# Patient Record
Sex: MALE | Race: WHITE | Employment: OTHER | URBAN - METROPOLITAN AREA
[De-identification: names, ages, dates, MRNs, and addresses within clinical notes are randomized per-mention and may not be internally consistent; named-entity substitution may affect disease eponyms.]

---

## 2017-01-20 ENCOUNTER — PATIENT OUTREACH (OUTPATIENT)
Dept: CASE MANAGEMENT | Age: 82
End: 2017-01-20

## 2017-01-20 DIAGNOSIS — M51.26 DISPLACEMENT OF LUMBAR INTERVERTEBRAL DISC WITHOUT MYELOPATHY: ICD-10-CM

## 2017-01-20 DIAGNOSIS — IMO0001 MILD AORTIC SCLEROSIS: ICD-10-CM

## 2017-01-20 DIAGNOSIS — M43.10 SPONDYLOLISTHESIS, GRADE 1: ICD-10-CM

## 2017-01-20 DIAGNOSIS — I87.2 VENOUS INSUFFICIENCY OF BOTH LOWER EXTREMITIES: ICD-10-CM

## 2017-01-20 DIAGNOSIS — N18.30 CKD (CHRONIC KIDNEY DISEASE) STAGE 3, GFR 30-59 ML/MIN (HCC): Primary | ICD-10-CM

## 2017-01-20 DIAGNOSIS — M48.061 SPINAL STENOSIS, LUMBAR REGION, WITHOUT NEUROGENIC CLAUDICATION: Chronic | ICD-10-CM

## 2017-01-20 DIAGNOSIS — E11.40 TYPE 2 DIABETES MELLITUS WITH DIABETIC NEUROPATHY, UNSPECIFIED LONG TERM INSULIN USE STATUS: ICD-10-CM

## 2017-01-20 DIAGNOSIS — E78.5 HYPERLIPIDEMIA WITH TARGET LDL LESS THAN 70: ICD-10-CM

## 2017-01-20 PROCEDURE — 99490 CHRNC CARE MGMT STAFF 1ST 20: CPT

## 2017-01-21 NOTE — PROGRESS NOTES
1/20/2017  Spoke to pt at length about CCM, current care plan and performed CCM assessment with pt reviewed meds and compliance.  Reviewed pt Ckd (chronic kidney disease) stage 3, gfr 30-59 ml/min  (primary encounter diagnosis)  Type 2 diabetes mellitus wit recovery time involved. Meds: Detailed medication review with pt. Discussed with pt if any potential barriers are present that could prevent compliance with medication regimen. At this time, no barriers reported.  Pt reports he is stable on all medications

## 2017-02-23 ENCOUNTER — APPOINTMENT (OUTPATIENT)
Dept: LAB | Age: 82
End: 2017-02-23
Attending: FAMILY MEDICINE
Payer: MEDICARE

## 2017-02-23 DIAGNOSIS — I87.2 VENOUS INSUFFICIENCY OF BOTH LOWER EXTREMITIES: ICD-10-CM

## 2017-02-23 DIAGNOSIS — IMO0001 MILD AORTIC SCLEROSIS: ICD-10-CM

## 2017-02-23 DIAGNOSIS — N18.30 CKD (CHRONIC KIDNEY DISEASE) STAGE 3, GFR 30-59 ML/MIN (HCC): ICD-10-CM

## 2017-02-23 LAB
BUN BLD-MCNC: 26 MG/DL (ref 8–20)
CALCIUM BLD-MCNC: 9.9 MG/DL (ref 8.3–10.3)
CHLORIDE: 101 MMOL/L (ref 101–111)
CO2: 33 MMOL/L (ref 22–32)
CREAT BLD-MCNC: 1.58 MG/DL (ref 0.7–1.3)
GLUCOSE BLD-MCNC: 111 MG/DL (ref 70–99)
POTASSIUM SERPL-SCNC: 3.7 MMOL/L (ref 3.6–5.1)
SODIUM SERPL-SCNC: 139 MMOL/L (ref 136–144)

## 2017-02-23 PROCEDURE — 36415 COLL VENOUS BLD VENIPUNCTURE: CPT

## 2017-02-23 PROCEDURE — 80048 BASIC METABOLIC PNL TOTAL CA: CPT

## 2017-03-02 ENCOUNTER — TELEPHONE (OUTPATIENT)
Dept: FAMILY MEDICINE CLINIC | Facility: CLINIC | Age: 82
End: 2017-03-02

## 2017-03-07 ENCOUNTER — APPOINTMENT (OUTPATIENT)
Dept: LAB | Age: 82
End: 2017-03-07
Attending: FAMILY MEDICINE
Payer: MEDICARE

## 2017-03-07 ENCOUNTER — TELEPHONE (OUTPATIENT)
Dept: FAMILY MEDICINE CLINIC | Facility: CLINIC | Age: 82
End: 2017-03-07

## 2017-03-07 DIAGNOSIS — R79.89 ELEVATED SERUM CREATININE: Primary | ICD-10-CM

## 2017-03-07 DIAGNOSIS — R79.89 ELEVATED SERUM CREATININE: ICD-10-CM

## 2017-03-07 PROCEDURE — 80048 BASIC METABOLIC PNL TOTAL CA: CPT

## 2017-03-07 PROCEDURE — 36415 COLL VENOUS BLD VENIPUNCTURE: CPT

## 2017-03-07 NOTE — TELEPHONE ENCOUNTER
Pt was to have BMP done again according to last labs note. Called Pt and advised. Patient verbalized understanding and agrees with plan. Pt is going to have done today.

## 2017-03-07 NOTE — TELEPHONE ENCOUNTER
Pt has his Medicare Annual appt on 3/8/17 and is wondering if Dr Kenji Mcginnis wants him to have [employer] work? Creatine? Prior to?  Please call pt as appt is for tomorrow

## 2017-03-08 ENCOUNTER — OFFICE VISIT (OUTPATIENT)
Dept: FAMILY MEDICINE CLINIC | Facility: CLINIC | Age: 82
End: 2017-03-08

## 2017-03-08 VITALS
TEMPERATURE: 97 F | HEIGHT: 72.5 IN | HEART RATE: 98 BPM | SYSTOLIC BLOOD PRESSURE: 122 MMHG | DIASTOLIC BLOOD PRESSURE: 56 MMHG | RESPIRATION RATE: 14 BRPM | WEIGHT: 178 LBS | BODY MASS INDEX: 23.85 KG/M2

## 2017-03-08 DIAGNOSIS — N18.30 CONTROLLED TYPE 2 DIABETES MELLITUS WITH STAGE 3 CHRONIC KIDNEY DISEASE, WITHOUT LONG-TERM CURRENT USE OF INSULIN (HCC): Primary | ICD-10-CM

## 2017-03-08 DIAGNOSIS — E11.22 CONTROLLED TYPE 2 DIABETES MELLITUS WITH STAGE 3 CHRONIC KIDNEY DISEASE, WITHOUT LONG-TERM CURRENT USE OF INSULIN (HCC): Primary | ICD-10-CM

## 2017-03-08 DIAGNOSIS — E11.40 TYPE 2 DIABETES MELLITUS WITH DIABETIC NEUROPATHY, WITHOUT LONG-TERM CURRENT USE OF INSULIN (HCC): ICD-10-CM

## 2017-03-08 DIAGNOSIS — IMO0001 MILD AORTIC SCLEROSIS: ICD-10-CM

## 2017-03-08 DIAGNOSIS — N18.30 CKD (CHRONIC KIDNEY DISEASE) STAGE 3, GFR 30-59 ML/MIN (HCC): ICD-10-CM

## 2017-03-08 DIAGNOSIS — E78.5 HYPERLIPIDEMIA WITH TARGET LDL LESS THAN 70: ICD-10-CM

## 2017-03-08 LAB
BUN BLD-MCNC: 20 MG/DL (ref 8–20)
CALCIUM BLD-MCNC: 10.4 MG/DL (ref 8.3–10.3)
CHLORIDE: 105 MMOL/L (ref 101–111)
CO2: 32 MMOL/L (ref 22–32)
CREAT BLD-MCNC: 1.38 MG/DL (ref 0.7–1.3)
GLUCOSE BLD-MCNC: 103 MG/DL (ref 70–99)
POTASSIUM SERPL-SCNC: 3.6 MMOL/L (ref 3.6–5.1)
SODIUM SERPL-SCNC: 143 MMOL/L (ref 136–144)

## 2017-03-08 PROCEDURE — 99214 OFFICE O/P EST MOD 30 MIN: CPT | Performed by: FAMILY MEDICINE

## 2017-03-08 NOTE — PROGRESS NOTES
HPI:   Michelle Gary is a 80year old male who presents for recheck of his diabetes.     Patient presents with:  Diabetes: 6 month f/u     FIT Colorectal Screening due on 03/19/1981  Colonoscopy,10 Years due on 03/19/1981     Diabetic flowsheet (Last 6 lb  02/06/17 : 180 lb    BP Readings from Last 3 Encounters:  03/08/17 : 122/56  02/06/17 : 121/63  11/10/16 : 128/52        HPI     Past History:   He  has a past medical history of Hypercholesterolemia; Diabetes mellitus ([address]  Irritable bowel syndrome; 3.8 01/13/2016 09:56 AM    03/07/2017 12:43 PM   K 3.6 03/07/2017 12:43 PM    03/07/2017 12:43 PM   CO2 32.0 03/07/2017 12:43 PM             REVIEW OF SYSTEMS:   Review of Systems GENERAL HEALTH: feels well otherwise  SKIN: denies any unusual s ordered. As for his hypertension, Blood Pressure is well controlled, no significant medication side effects noted.    PLAN: will continue present medications, reviewed diet, exercise and weight control, and labs as ordered     As for his cholesterol, Hany Eth

## 2017-03-09 NOTE — ASSESSMENT & PLAN NOTE
Stable, Continue present management.     Cholesterol Lowering Medications          simvastatin (ZOCOR) 80 MG Oral Tab

## 2017-03-14 ENCOUNTER — PATIENT OUTREACH (OUTPATIENT)
Dept: CASE MANAGEMENT | Age: 82
End: 2017-03-14

## 2017-03-14 DIAGNOSIS — N18.30 CKD (CHRONIC KIDNEY DISEASE) STAGE 3, GFR 30-59 ML/MIN (HCC): Primary | ICD-10-CM

## 2017-03-14 DIAGNOSIS — I87.2 VENOUS INSUFFICIENCY OF BOTH LOWER EXTREMITIES: ICD-10-CM

## 2017-03-14 DIAGNOSIS — N18.30 CONTROLLED TYPE 2 DIABETES MELLITUS WITH STAGE 3 CHRONIC KIDNEY DISEASE, WITHOUT LONG-TERM CURRENT USE OF INSULIN (HCC): ICD-10-CM

## 2017-03-14 DIAGNOSIS — M43.10 SPONDYLOLISTHESIS, GRADE 1: ICD-10-CM

## 2017-03-14 DIAGNOSIS — E78.5 HYPERLIPIDEMIA WITH TARGET LDL LESS THAN 70: ICD-10-CM

## 2017-03-14 DIAGNOSIS — E11.40 TYPE 2 DIABETES MELLITUS WITH DIABETIC NEUROPATHY, WITHOUT LONG-TERM CURRENT USE OF INSULIN (HCC): ICD-10-CM

## 2017-03-14 DIAGNOSIS — M48.061 SPINAL STENOSIS, LUMBAR REGION, WITHOUT NEUROGENIC CLAUDICATION: Chronic | ICD-10-CM

## 2017-03-14 DIAGNOSIS — M51.26 DISPLACEMENT OF LUMBAR INTERVERTEBRAL DISC WITHOUT MYELOPATHY: ICD-10-CM

## 2017-03-14 DIAGNOSIS — E11.22 CONTROLLED TYPE 2 DIABETES MELLITUS WITH STAGE 3 CHRONIC KIDNEY DISEASE, WITHOUT LONG-TERM CURRENT USE OF INSULIN (HCC): ICD-10-CM

## 2017-03-14 NOTE — PROGRESS NOTES
3/14/2017  Called and LM on identified home phone requesting patient return my call at [phone].      3/15/2017  Returned incoming call from pt. Called and LM on identified home phone requesting patient return my call at [phone].

## 2017-03-15 PROCEDURE — 99490 CHRNC CARE MGMT STAFF 1ST 20: CPT

## 2017-03-16 NOTE — PROGRESS NOTES
3/17/2017  Spoke to pt at length about CCM, current care plan and performed CCM assessment with pt reviewed meds and compliance.  Reviewed pt Patient Active Problem List:     Hyperlipidemia with target LDL less than 70     Type 2 diabetes mellitus, controll appointments     Date & Time Appointment Department [facility])    Wednesday July 12, 2017 10:00 AM Medicare Annual Well Visit with MD Amy Villegas 26, [address], [place]  ([address])        Amy Hernandez, R

## 2017-04-11 ENCOUNTER — TELEPHONE (OUTPATIENT)
Dept: FAMILY MEDICINE CLINIC | Facility: CLINIC | Age: 82
End: 2017-04-11

## 2017-04-18 ENCOUNTER — PATIENT OUTREACH (OUTPATIENT)
Dept: CASE MANAGEMENT | Age: 82
End: 2017-04-18

## 2017-04-18 DIAGNOSIS — IMO0001 MILD AORTIC SCLEROSIS: ICD-10-CM

## 2017-04-18 DIAGNOSIS — E11.40 TYPE 2 DIABETES MELLITUS WITH DIABETIC NEUROPATHY, WITHOUT LONG-TERM CURRENT USE OF INSULIN (HCC): ICD-10-CM

## 2017-04-18 DIAGNOSIS — N18.30 CKD (CHRONIC KIDNEY DISEASE) STAGE 3, GFR 30-59 ML/MIN (HCC): Primary | ICD-10-CM

## 2017-04-18 DIAGNOSIS — M51.26 DISPLACEMENT OF LUMBAR INTERVERTEBRAL DISC WITHOUT MYELOPATHY: ICD-10-CM

## 2017-04-18 DIAGNOSIS — E78.5 HYPERLIPIDEMIA WITH TARGET LDL LESS THAN 70: ICD-10-CM

## 2017-04-18 DIAGNOSIS — M48.061 SPINAL STENOSIS, LUMBAR REGION, WITHOUT NEUROGENIC CLAUDICATION: Chronic | ICD-10-CM

## 2017-04-18 DIAGNOSIS — I87.2 VENOUS INSUFFICIENCY OF BOTH LOWER EXTREMITIES: ICD-10-CM

## 2017-04-18 DIAGNOSIS — M43.10 SPONDYLOLISTHESIS, GRADE 1: ICD-10-CM

## 2017-04-18 PROCEDURE — 99490 CHRNC CARE MGMT STAFF 1ST 20: CPT

## 2017-04-18 NOTE — PROGRESS NOTES
4/18/2017  Spoke to pt at length about CCM, current care plan and performed CCM assessment with pt reviewed meds and compliance.  Reviewed pt Patient Active Problem List:     Hyperlipidemia with target LDL less than 70     Type 2 diabetes mellitus, controll is now wearing compression hose daily and this has been effective for leg pain. Meds: Brief medication review with pt. Discussed with pt if any potential barriers are present that could prevent compliance with medication regimen.  At this time, no barriers

## 2017-04-18 NOTE — PROGRESS NOTES
4/18/2017  Attempted to contact pt for CCM. Called and LM on identified home phone requesting patient return my call at [phone]. Phone was answered and hung up twice.

## 2017-05-17 ENCOUNTER — TELEPHONE (OUTPATIENT)
Dept: FAMILY MEDICINE CLINIC | Facility: CLINIC | Age: 82
End: 2017-05-17

## 2017-05-17 NOTE — TELEPHONE ENCOUNTER
Called Pt to get condition update. Dr Sonja Cabrera received call this morning that the paramedics had to be call after pt had fallen to floor and was weak and a little disoriented. Paramedics found Pt had a blood sugar of 30. Pt was given food.  Pt now admits he

## 2017-05-17 NOTE — TELEPHONE ENCOUNTER
Future Appointments  Date Time Provider Cat Nuno   7/12/2017 10:00 AM Yaneli Melgar MD [facility] 3 [facility]      Okay to double book me tomorrow with him for follow-up or sometime next week if you cannot make tomorrow

## 2017-05-17 NOTE — TELEPHONE ENCOUNTER
Received call that patient had low BS and had to call EMS. Feeling better. Please check in on patient and see if f/u need with Dr. Yamilet Elizabeth.

## 2017-05-22 ENCOUNTER — OFFICE VISIT (OUTPATIENT)
Dept: FAMILY MEDICINE CLINIC | Facility: CLINIC | Age: 82
End: 2017-05-22

## 2017-05-22 ENCOUNTER — PATIENT OUTREACH (OUTPATIENT)
Dept: CASE MANAGEMENT | Age: 82
End: 2017-05-22

## 2017-05-22 VITALS
BODY MASS INDEX: 24 KG/M2 | RESPIRATION RATE: 12 BRPM | HEART RATE: 80 BPM | WEIGHT: 178 LBS | SYSTOLIC BLOOD PRESSURE: 130 MMHG | DIASTOLIC BLOOD PRESSURE: 54 MMHG

## 2017-05-22 DIAGNOSIS — E11.649 SEVERE DIABETIC HYPOGLYCEMIA (HCC): ICD-10-CM

## 2017-05-22 DIAGNOSIS — E11.40 TYPE 2 DIABETES MELLITUS WITH DIABETIC NEUROPATHY, WITHOUT LONG-TERM CURRENT USE OF INSULIN (HCC): Primary | ICD-10-CM

## 2017-05-22 PROCEDURE — 99214 OFFICE O/P EST MOD 30 MIN: CPT | Performed by: FAMILY MEDICINE

## 2017-05-22 NOTE — PROGRESS NOTES
Patient presents with:  Diabetes: Had low BS episode 5/17/17- BS was 30, EMS was called. Pt was given food and declined ER.  Pt feeling better      HPI:   This is a 80year old male coming in for low sugar level last week, some confusion over night, awoke o EXAM:   /54 mmHg  Pulse 80  Resp 12  Wt 178 lb Body mass index is 23.8 kg/(m^2). Physical Exam   Nursing note and vitals reviewed. Constitutional: He is oriented to person, place, and time. He appears well-developed and well-nourished.  No dis of insulin ([facility] [address]): established and worsening     Amount and/or Complexity of Data Reviewed  Clinical lab tests: reviewed  Tests in the radiology section of CPT®: ordered    Risk of Complications, Morbidity, and/or Mortality  Presenting problems: high  Diagn

## 2017-05-22 NOTE — PATIENT INSTRUCTIONS
Stop glipizined for diabetes as it has more risk of causing the low sugars than anything else.    Future Appointments  Date Time Provider Cat Nuno   7/12/2017 10:00 AM Diomedes Reina MD [facility] 3 [facility]

## 2017-05-22 NOTE — PROGRESS NOTES
I called to introduce myself to patient as their new care manager. I noticed that patient had an episode with his BS being very low and had to call 911. Patient notified office and PCP wanted patient to f/u right away.  Message sent to clinical supervisor t

## 2017-05-23 NOTE — ASSESSMENT & PLAN NOTE
Likely over treated last week, low BS at 30, due to poor intake and sulfonyurea medication, hold glipizined 5 mg and reassess in 1 month as scheduled

## 2017-05-24 ENCOUNTER — PATIENT OUTREACH (OUTPATIENT)
Dept: CASE MANAGEMENT | Age: 82
End: 2017-05-24

## 2017-05-24 NOTE — PROGRESS NOTES
I spoke with patient to f/u from his appt with PCP on 5/22/17. Patient was instructed to stop glipized. Patient states that he is feeling fine.     Coordination of care spent with patient 3 minutes    Total monthly time 8 minutes

## 2017-06-21 ENCOUNTER — TELEPHONE (OUTPATIENT)
Dept: FAMILY MEDICINE CLINIC | Facility: CLINIC | Age: 82
End: 2017-06-21

## 2017-06-21 NOTE — TELEPHONE ENCOUNTER
Called and talked to patient explained that Dr Cely Valenzuela wanted him to monitor his BS and discuss it at his next appointment patient is OK with this

## 2017-06-21 NOTE — TELEPHONE ENCOUNTER
Patient checked blood pressure this morning, numbers were at 280, it is now down to 255. Would like to speak to nurse.

## 2017-06-21 NOTE — TELEPHONE ENCOUNTER
Talked to patient his Blood glucose is 255 after taking his metformin 850 mg daily he is quite concerned about this and is feeling like he has a cold from yesterday I will ask Dr Kenneth Trevizo what he wants to do about this

## 2017-06-21 NOTE — TELEPHONE ENCOUNTER
Diabetes  (most recent labs)     Lab Results  Component Value Date/Time   A1C 6.5 03/23/2017       this should be OK, continue to hector, A1c good last time.  Future Appointments  Date Time Provider Cat Nuno   7/12/2017 10:00 AM Tianna Spencer MD E

## 2017-06-22 ENCOUNTER — TELEPHONE (OUTPATIENT)
Dept: FAMILY MEDICINE CLINIC | Facility: CLINIC | Age: 82
End: 2017-06-22

## 2017-06-22 NOTE — TELEPHONE ENCOUNTER
Patient requesting to speak to Dexter Santos regarding BP. Patient sates bp last night was 207 and this morning 192.

## 2017-06-22 NOTE — TELEPHONE ENCOUNTER
Called patient back told him Dr Jonathan Islas with [employer] use patient acknowledges understanding of this plan

## 2017-06-22 NOTE — TELEPHONE ENCOUNTER
Today  feels it is coming down well he wanted to know what Dr Toby Kurtz thought of him starting to take a probiotic?  I discussed this with him pros and cons he would like to know what Dr Fausto Bradford thinks

## 2017-06-29 ENCOUNTER — PATIENT OUTREACH (OUTPATIENT)
Dept: CASE MANAGEMENT | Age: 82
End: 2017-06-29

## 2017-07-03 ENCOUNTER — TELEPHONE (OUTPATIENT)
Dept: FAMILY MEDICINE CLINIC | Facility: CLINIC | Age: 82
End: 2017-07-03

## 2017-07-03 DIAGNOSIS — N18.30 CONTROLLED TYPE 2 DIABETES MELLITUS WITH STAGE 3 CHRONIC KIDNEY DISEASE, WITHOUT LONG-TERM CURRENT USE OF INSULIN (HCC): Primary | ICD-10-CM

## 2017-07-03 DIAGNOSIS — E11.22 CONTROLLED TYPE 2 DIABETES MELLITUS WITH STAGE 3 CHRONIC KIDNEY DISEASE, WITHOUT LONG-TERM CURRENT USE OF INSULIN (HCC): Primary | ICD-10-CM

## 2017-07-03 NOTE — TELEPHONE ENCOUNTER
Pt states that he uses [facility] extra. Called [facility] and they do not carry that. Sent Rx for a new device, lancets and strips that will be covered by pt's insurance.

## 2017-07-03 NOTE — TELEPHONE ENCOUNTER
Patient requesting a refill on diabetic test strips and needles sent to [facility]. Please contact patient.

## 2017-07-06 ENCOUNTER — TELEPHONE (OUTPATIENT)
Dept: FAMILY MEDICINE CLINIC | Facility: CLINIC | Age: 82
End: 2017-07-06

## 2017-07-12 ENCOUNTER — APPOINTMENT (OUTPATIENT)
Dept: LAB | Age: 82
End: 2017-07-12
Attending: FAMILY MEDICINE
Payer: MEDICARE

## 2017-07-12 ENCOUNTER — OFFICE VISIT (OUTPATIENT)
Dept: FAMILY MEDICINE CLINIC | Facility: CLINIC | Age: 82
End: 2017-07-12

## 2017-07-12 VITALS
HEIGHT: 71.5 IN | HEART RATE: 72 BPM | TEMPERATURE: 97 F | BODY MASS INDEX: 23.83 KG/M2 | SYSTOLIC BLOOD PRESSURE: 130 MMHG | WEIGHT: 174 LBS | RESPIRATION RATE: 16 BRPM | DIASTOLIC BLOOD PRESSURE: 60 MMHG

## 2017-07-12 DIAGNOSIS — Z13.39 SCREENING FOR ALCOHOL PROBLEM: ICD-10-CM

## 2017-07-12 DIAGNOSIS — N18.30 CONTROLLED TYPE 2 DIABETES MELLITUS WITH STAGE 3 CHRONIC KIDNEY DISEASE, WITHOUT LONG-TERM CURRENT USE OF INSULIN (HCC): Chronic | ICD-10-CM

## 2017-07-12 DIAGNOSIS — E11.22 CONTROLLED TYPE 2 DIABETES MELLITUS WITH STAGE 3 CHRONIC KIDNEY DISEASE, WITHOUT LONG-TERM CURRENT USE OF INSULIN (HCC): Chronic | ICD-10-CM

## 2017-07-12 DIAGNOSIS — IMO0001 MILD AORTIC SCLEROSIS: ICD-10-CM

## 2017-07-12 DIAGNOSIS — E78.5 HYPERLIPIDEMIA WITH TARGET LDL LESS THAN 70: ICD-10-CM

## 2017-07-12 DIAGNOSIS — Z00.00 ANNUAL PHYSICAL EXAM: Primary | ICD-10-CM

## 2017-07-12 DIAGNOSIS — Z00.00 ENCOUNTER FOR ANNUAL HEALTH EXAMINATION: ICD-10-CM

## 2017-07-12 DIAGNOSIS — E11.40 TYPE 2 DIABETES MELLITUS WITH DIABETIC NEUROPATHY, WITHOUT LONG-TERM CURRENT USE OF INSULIN (HCC): Chronic | ICD-10-CM

## 2017-07-12 DIAGNOSIS — Z13.31 DEPRESSION SCREENING: ICD-10-CM

## 2017-07-12 LAB
ALBUMIN SERPL-MCNC: 3.4 G/DL (ref 3.5–4.8)
ALP LIVER SERPL-CCNC: 69 U/L (ref 45–117)
ALT SERPL-CCNC: 37 U/L (ref 17–63)
AST SERPL-CCNC: 26 U/L (ref 15–41)
BILIRUB SERPL-MCNC: 0.5 MG/DL (ref 0.1–2)
BUN BLD-MCNC: 22 MG/DL (ref 8–20)
CALCIUM BLD-MCNC: 10.8 MG/DL (ref 8.3–10.3)
CHLORIDE: 107 MMOL/L (ref 101–111)
CO2: 30 MMOL/L (ref 22–32)
CREAT BLD-MCNC: 1.56 MG/DL (ref 0.7–1.3)
EST. AVERAGE GLUCOSE BLD GHB EST-MCNC: 177 MG/DL (ref 68–126)
GLUCOSE BLD-MCNC: 168 MG/DL (ref 70–99)
HBA1C MFR BLD HPLC: 7.8 % (ref ?–5.7)
M PROTEIN MFR SERPL ELPH: 7.1 G/DL (ref 6.1–8.3)
POTASSIUM SERPL-SCNC: 3.8 MMOL/L (ref 3.6–5.1)
SODIUM SERPL-SCNC: 144 MMOL/L (ref 136–144)

## 2017-07-12 PROCEDURE — G0439 PPPS, SUBSEQ VISIT: HCPCS | Performed by: FAMILY MEDICINE

## 2017-07-12 PROCEDURE — G0444 DEPRESSION SCREEN ANNUAL: HCPCS | Performed by: FAMILY MEDICINE

## 2017-07-12 PROCEDURE — 83036 HEMOGLOBIN GLYCOSYLATED A1C: CPT

## 2017-07-12 PROCEDURE — 80053 COMPREHEN METABOLIC PANEL: CPT

## 2017-07-12 PROCEDURE — G0442 ANNUAL ALCOHOL SCREEN 15 MIN: HCPCS | Performed by: FAMILY MEDICINE

## 2017-07-12 PROCEDURE — 36415 COLL VENOUS BLD VENIPUNCTURE: CPT

## 2017-07-12 NOTE — PROGRESS NOTES
HPI:   Celina Hernadez is a 80year old male who presents for a Medicare Subsequent Annual Wellness visit (Pt already had Initial Annual Wellness).   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 TOMAS MICROLET LANCETS Does not apply Misc 1 lancet by Finger stick route daily. Use as directed. torsemide 20 MG Oral Tab Take 1 tablet (20 mg total) by mouth 2 (two) times daily.    MetFORMIN HCl (GLUCOPHAGE) 850 MG Oral Tab [facility] 2/26/2015); injection, anesthetic/steroid, transforaminal epidural; lumbar/sacral, single level (Bilateral, 2/26/2015); patient withough preoperative order for iv antibiotic surgical site infection prophylaxis.  (Bilateral, 2/26/2015); patient documented no body mass index is 23.93 kg/m² as calculated from the following:    Height as of this encounter: 71.5\". Weight as of this encounter: 174 lb.     Medicare Hearing Assessment  (Required for AWV/SWV)    Whispered Voice      Visual Acuity  Right Eye Visual adenopathy. Neurological: He is alert and oriented to person, place, and time. He has normal strength and normal reflexes. No cranial nerve deficit or sensory deficit. Skin: Skin is warm, dry and intact. No rash noted. He is not diaphoretic.  No cyanosi compliance and exercise, see ophthalmology soon, check feet daily and will check labs as ordered.       Blood Sugar Medications          MetFORMIN HCl (GLUCOPHAGE) 850 MG Oral Tab                 Relevant Orders    DEPRESSION SCREEN ANNUAL    ANNUAL ALCOHOL such as Influenza, Hepatitis B, Tetanus, or Pneumococcal?: Yes     Functional Ability     Bathing or Showering: Able without help    Toileting: Able without help    Dressing: Able without help    Eating: Able without help    Driving: Able without help    P for quick review of chart, separate sheet to patient  [address] Internal Lab or Procedure External Lab or Procedure   Diabetes Screening      HbgA1C   Annually HEMOGLOBIN A1C (%)   Date Value   07/11/2016 5.8 (A)     H Annually Potassium (mmol/L)   Date Value   03/07/2017 3.6    No flowsheet data found. Creatinine  Annually CREATININE (mg/dL)   Date Value   03/23/2017 1.33    No flowsheet data found.     Drug Serum Conc  Annually No results found for: DIGOXIN, DIG, [facility]

## 2017-07-12 NOTE — PATIENT INSTRUCTIONS
Ziyad Bueno's SCREENING SCHEDULE   Tests on this list are recommended by your physician but may not be covered, or covered at this frequency, by your insurer. Please check with your insurance carrier before scheduling to verify coverage.     PREVENT Covered up to Age 76     Colonoscopy Screen   Covered every 10 years- more often if abnormal There are no preventive care reminders to display for this patient.  Update Health Maintenance if applicable    Flex Sigmoidoscopy Screen  Covered every 5 years No with metal- TD and TDaP Not covered by Medicare Part B) No orders found for this or any previous visit.  This may be covered with your prescription benefits, but Medicare does not cover unless Medically needed    Zoster (Not covered by Medicare Part B) No o

## 2017-07-13 ENCOUNTER — TELEPHONE (OUTPATIENT)
Dept: FAMILY MEDICINE CLINIC | Facility: CLINIC | Age: 82
End: 2017-07-13

## 2017-07-13 DIAGNOSIS — E11.22 CONTROLLED TYPE 2 DIABETES MELLITUS WITH STAGE 3 CHRONIC KIDNEY DISEASE, WITHOUT LONG-TERM CURRENT USE OF INSULIN (HCC): Primary | Chronic | ICD-10-CM

## 2017-07-13 DIAGNOSIS — N18.30 CKD (CHRONIC KIDNEY DISEASE) STAGE 3, GFR 30-59 ML/MIN (HCC): ICD-10-CM

## 2017-07-13 DIAGNOSIS — N18.30 CONTROLLED TYPE 2 DIABETES MELLITUS WITH STAGE 3 CHRONIC KIDNEY DISEASE, WITHOUT LONG-TERM CURRENT USE OF INSULIN (HCC): Primary | Chronic | ICD-10-CM

## 2017-07-16 DIAGNOSIS — I87.2 VENOUS INSUFFICIENCY OF BOTH LOWER EXTREMITIES: ICD-10-CM

## 2017-07-16 DIAGNOSIS — N18.30 CKD (CHRONIC KIDNEY DISEASE) STAGE 3, GFR 30-59 ML/MIN (HCC): ICD-10-CM

## 2017-07-19 RX ORDER — TORSEMIDE 20 MG/1
20 TABLET ORAL 2 TIMES DAILY
Qty: 60 TABLET | Refills: 2 | Status: SHIPPED | OUTPATIENT
Start: 2017-07-19 | End: 2017-08-07

## 2017-07-19 NOTE — TELEPHONE ENCOUNTER
Incoming request for Torsemide. LOV 7/12/2017 for annual and last labs done 7/12/2017.  Future Appointments  Date Time Provider Cat Nurys   10/12/2017 9:30 AM Dorothea Gomez MD [facility] 3 [facility]     Medication was last prescribed on 11/10/2016 for ve

## 2017-07-20 ENCOUNTER — TELEPHONE (OUTPATIENT)
Dept: FAMILY MEDICINE CLINIC | Facility: CLINIC | Age: 82
End: 2017-07-20

## 2017-07-20 NOTE — TELEPHONE ENCOUNTER
LOV 7/12/17 with . Joe forgot to discuss pain and swelling in both legs ankle to mid calf. This has been going on for some time.   Shashi Allen has suggested using support socks in the past.  The swelling occurs throughout the day, more pronounced by ev

## 2017-07-21 NOTE — TELEPHONE ENCOUNTER
Is he using compression stockings? If not, we can prescribe Teds medium-high compression knee high stockings.      If not, we can double his torsemide 20 mg to 40 mg daily and f.u in 4 weeks

## 2017-07-21 NOTE — TELEPHONE ENCOUNTER
Called and talked to patient he is wearing compression stockings daily so he will increase the torsemide and made an appointment for 4 weeks from now

## 2017-08-07 ENCOUNTER — TELEPHONE (OUTPATIENT)
Dept: FAMILY MEDICINE CLINIC | Facility: CLINIC | Age: 82
End: 2017-08-07

## 2017-08-07 DIAGNOSIS — I87.2 VENOUS INSUFFICIENCY OF BOTH LOWER EXTREMITIES: ICD-10-CM

## 2017-08-07 DIAGNOSIS — N18.30 CKD (CHRONIC KIDNEY DISEASE) STAGE 3, GFR 30-59 ML/MIN (HCC): ICD-10-CM

## 2017-08-07 RX ORDER — TORSEMIDE 20 MG/1
40 TABLET ORAL 2 TIMES DAILY
Qty: 120 TABLET | Refills: 5 | Status: SHIPPED | OUTPATIENT
Start: 2017-08-07 | End: 2019-01-21

## 2017-08-07 NOTE — TELEPHONE ENCOUNTER
Patient called and stated Dr Gabriel Aquino had him increase Torsemide to 2 tablets BID so he is running out sooner than expected not on protocol will ask Dr Gabriel Aquino if he wants him to continue taking this at 2 tabs twice a day 120/month.

## 2017-08-09 ENCOUNTER — PATIENT OUTREACH (OUTPATIENT)
Dept: CASE MANAGEMENT | Age: 82
End: 2017-08-09

## 2017-08-09 DIAGNOSIS — E11.40 TYPE 2 DIABETES MELLITUS WITH DIABETIC NEUROPATHY, WITHOUT LONG-TERM CURRENT USE OF INSULIN (HCC): Chronic | ICD-10-CM

## 2017-08-09 DIAGNOSIS — E78.5 HYPERLIPIDEMIA WITH TARGET LDL LESS THAN 70: ICD-10-CM

## 2017-08-09 DIAGNOSIS — N18.30 CKD (CHRONIC KIDNEY DISEASE) STAGE 3, GFR 30-59 ML/MIN (HCC): ICD-10-CM

## 2017-08-09 PROCEDURE — 99490 CHRNC CARE MGMT STAFF 1ST 20: CPT

## 2017-08-09 NOTE — PROGRESS NOTES
I called and spoke with pt to see how he is doing. Pt stated that his sugars have been running a little high. We went over what he eats through out the day. Pt stated that he doesn't always eat at the same time and does snack late at night.  I advised pt to

## 2017-08-10 ENCOUNTER — TELEPHONE (OUTPATIENT)
Dept: FAMILY MEDICINE CLINIC | Facility: CLINIC | Age: 82
End: 2017-08-10

## 2017-08-10 NOTE — TELEPHONE ENCOUNTER
Pt has been concerned about elevated BS levels. Pt states he checks his BS almost every morning and BS have been running 170-220 and was 241 today. I reviewed med list with Pt and it states Pt Is on Metformin 850mg daily.  Pt states he takes meds every morn

## 2017-08-10 NOTE — TELEPHONE ENCOUNTER
Thanks, it sounds like his memory is getting more problematic as well. [facility] has been supplying his medications and doing his A1c and we have been obstructing them into her chart.   The pillbox sounds like a great idea and twice daily metformin would be an ex

## 2017-08-21 ENCOUNTER — OFFICE VISIT (OUTPATIENT)
Dept: FAMILY MEDICINE CLINIC | Facility: CLINIC | Age: 82
End: 2017-08-21

## 2017-08-21 VITALS
SYSTOLIC BLOOD PRESSURE: 122 MMHG | BODY MASS INDEX: 23.1 KG/M2 | HEART RATE: 100 BPM | DIASTOLIC BLOOD PRESSURE: 60 MMHG | WEIGHT: 165 LBS | RESPIRATION RATE: 20 BRPM | HEIGHT: 71 IN

## 2017-08-21 DIAGNOSIS — N18.30 CKD (CHRONIC KIDNEY DISEASE) STAGE 3, GFR 30-59 ML/MIN (HCC): ICD-10-CM

## 2017-08-21 DIAGNOSIS — E11.22 CONTROLLED TYPE 2 DIABETES MELLITUS WITH STAGE 3 CHRONIC KIDNEY DISEASE, WITHOUT LONG-TERM CURRENT USE OF INSULIN (HCC): ICD-10-CM

## 2017-08-21 DIAGNOSIS — R41.3 MEMORY CHANGE: ICD-10-CM

## 2017-08-21 DIAGNOSIS — N18.30 CONTROLLED TYPE 2 DIABETES MELLITUS WITH STAGE 3 CHRONIC KIDNEY DISEASE, WITHOUT LONG-TERM CURRENT USE OF INSULIN (HCC): ICD-10-CM

## 2017-08-21 DIAGNOSIS — I87.2 VENOUS INSUFFICIENCY OF BOTH LOWER EXTREMITIES: Primary | ICD-10-CM

## 2017-08-21 PROCEDURE — 99213 OFFICE O/P EST LOW 20 MIN: CPT | Performed by: FAMILY MEDICINE

## 2017-08-21 NOTE — PROGRESS NOTES
Patient presents with:  Weakness: in bilteral legs  Lesion: temple area      HPI:   This is a 80year old male coming in for swelling, more so than before, no real change sinnce last week.  trsemide 40 form 8.7    HPI     He  reports that he has never smoke Left arm)   Pulse 100   Resp 20   Ht 71\"   Wt 165 lb   BMI 23.01 kg/m²  Body mass index is 23.01 kg/m². Physical Exam   Nursing note and vitals reviewed. Constitutional: He is oriented to person, place, and time.  He appears well-developed and well-nou Relevant Orders    BASIC METABOLIC PANEL (8)       Genitourinary    CKD (chronic kidney disease) stage 3, GFR 30-59 ml/min    Overview     Creatinine  Increased suddenly fall 2015, and adjusting meds has not decreased.          Current Assessment & Plan

## 2017-08-22 NOTE — ASSESSMENT & PLAN NOTE
Lab Results  Component Value Date/Time   CREATSERUM 1.56 (H) 07/12/2017 11:09 AM   GFR 40 (L) 07/12/2017 11:09 AM      Stable function, continue to watch

## 2017-08-24 ENCOUNTER — APPOINTMENT (OUTPATIENT)
Dept: LAB | Age: 82
End: 2017-08-24
Attending: FAMILY MEDICINE
Payer: MEDICARE

## 2017-08-24 DIAGNOSIS — N18.30 CONTROLLED TYPE 2 DIABETES MELLITUS WITH STAGE 3 CHRONIC KIDNEY DISEASE, WITHOUT LONG-TERM CURRENT USE OF INSULIN (HCC): Chronic | ICD-10-CM

## 2017-08-24 DIAGNOSIS — N18.30 CKD (CHRONIC KIDNEY DISEASE) STAGE 3, GFR 30-59 ML/MIN (HCC): ICD-10-CM

## 2017-08-24 DIAGNOSIS — E11.22 CONTROLLED TYPE 2 DIABETES MELLITUS WITH STAGE 3 CHRONIC KIDNEY DISEASE, WITHOUT LONG-TERM CURRENT USE OF INSULIN (HCC): Chronic | ICD-10-CM

## 2017-08-24 LAB
ALBUMIN SERPL-MCNC: 3.6 G/DL (ref 3.5–4.8)
ALP LIVER SERPL-CCNC: 74 U/L (ref 45–117)
ALT SERPL-CCNC: 71 U/L (ref 17–63)
AST SERPL-CCNC: 34 U/L (ref 15–41)
BILIRUB SERPL-MCNC: 0.5 MG/DL (ref 0.1–2)
BUN BLD-MCNC: 30 MG/DL (ref 8–20)
CALCIUM BLD-MCNC: 10.2 MG/DL (ref 8.3–10.3)
CHLORIDE: 98 MMOL/L (ref 101–111)
CO2: 34 MMOL/L (ref 22–32)
CREAT BLD-MCNC: 1.7 MG/DL (ref 0.7–1.3)
EST. AVERAGE GLUCOSE BLD GHB EST-MCNC: 212 MG/DL (ref 68–126)
GLUCOSE BLD-MCNC: 167 MG/DL (ref 70–99)
HBA1C MFR BLD HPLC: 9 % (ref ?–5.7)
M PROTEIN MFR SERPL ELPH: 7.5 G/DL (ref 6.1–8.3)
POTASSIUM SERPL-SCNC: 3.4 MMOL/L (ref 3.6–5.1)
SODIUM SERPL-SCNC: 138 MMOL/L (ref 136–144)

## 2017-08-24 PROCEDURE — 83036 HEMOGLOBIN GLYCOSYLATED A1C: CPT

## 2017-08-24 PROCEDURE — 80053 COMPREHEN METABOLIC PANEL: CPT

## 2017-08-24 PROCEDURE — 36415 COLL VENOUS BLD VENIPUNCTURE: CPT

## 2017-08-25 ENCOUNTER — TELEPHONE (OUTPATIENT)
Dept: FAMILY MEDICINE CLINIC | Facility: CLINIC | Age: 82
End: 2017-08-25

## 2017-08-25 DIAGNOSIS — R79.89 ELEVATED SERUM CREATININE: Primary | ICD-10-CM

## 2017-08-25 DIAGNOSIS — R79.9 ELEVATED BUN: ICD-10-CM

## 2017-08-25 NOTE — TELEPHONE ENCOUNTER
Notes Recorded by Jayna Lopez MD on 8/24/2017 at 8:36 PM CDT  9.20 visit, worse DM, worse Creatinine , we will repeat bmp just before next visit.  Dx CKD 3    Results reviewed with Pt  Pt A1C was 9.0- I mailed information on diabetic diet

## 2017-09-20 ENCOUNTER — OFFICE VISIT (OUTPATIENT)
Dept: FAMILY MEDICINE CLINIC | Facility: CLINIC | Age: 82
End: 2017-09-20

## 2017-09-20 ENCOUNTER — APPOINTMENT (OUTPATIENT)
Dept: LAB | Age: 82
End: 2017-09-20
Attending: FAMILY MEDICINE
Payer: MEDICARE

## 2017-09-20 VITALS
BODY MASS INDEX: 23.1 KG/M2 | SYSTOLIC BLOOD PRESSURE: 114 MMHG | TEMPERATURE: 99 F | RESPIRATION RATE: 16 BRPM | WEIGHT: 165 LBS | DIASTOLIC BLOOD PRESSURE: 50 MMHG | HEIGHT: 71 IN | HEART RATE: 88 BPM

## 2017-09-20 DIAGNOSIS — E11.22 CONTROLLED TYPE 2 DIABETES MELLITUS WITH STAGE 3 CHRONIC KIDNEY DISEASE, WITHOUT LONG-TERM CURRENT USE OF INSULIN (HCC): Chronic | ICD-10-CM

## 2017-09-20 DIAGNOSIS — I87.2 VENOUS INSUFFICIENCY OF BOTH LOWER EXTREMITIES: Primary | ICD-10-CM

## 2017-09-20 DIAGNOSIS — R79.9 ELEVATED BUN: ICD-10-CM

## 2017-09-20 DIAGNOSIS — E11.40 TYPE 2 DIABETES MELLITUS WITH DIABETIC NEUROPATHY, WITHOUT LONG-TERM CURRENT USE OF INSULIN (HCC): Chronic | ICD-10-CM

## 2017-09-20 DIAGNOSIS — N18.30 CONTROLLED TYPE 2 DIABETES MELLITUS WITH STAGE 3 CHRONIC KIDNEY DISEASE, WITHOUT LONG-TERM CURRENT USE OF INSULIN (HCC): Chronic | ICD-10-CM

## 2017-09-20 DIAGNOSIS — R79.89 ELEVATED SERUM CREATININE: ICD-10-CM

## 2017-09-20 LAB
BUN BLD-MCNC: 18 MG/DL (ref 8–20)
CALCIUM BLD-MCNC: 9.7 MG/DL (ref 8.3–10.3)
CHLORIDE: 100 MMOL/L (ref 101–111)
CO2: 30 MMOL/L (ref 22–32)
CREAT BLD-MCNC: 1.85 MG/DL (ref 0.7–1.3)
GLUCOSE BLD-MCNC: 298 MG/DL (ref 70–99)
POTASSIUM SERPL-SCNC: 3.3 MMOL/L (ref 3.6–5.1)
SODIUM SERPL-SCNC: 141 MMOL/L (ref 136–144)

## 2017-09-20 PROCEDURE — 99214 OFFICE O/P EST MOD 30 MIN: CPT | Performed by: FAMILY MEDICINE

## 2017-09-20 PROCEDURE — 36415 COLL VENOUS BLD VENIPUNCTURE: CPT

## 2017-09-20 PROCEDURE — 80048 BASIC METABOLIC PNL TOTAL CA: CPT

## 2017-09-20 RX ORDER — GLIPIZIDE 5 MG/1
5 TABLET, FILM COATED, EXTENDED RELEASE ORAL DAILY
Qty: 90 TABLET | Refills: 1 | Status: SHIPPED | OUTPATIENT
Start: 2017-09-20 | End: 2019-06-18

## 2017-09-20 NOTE — ASSESSMENT & PLAN NOTE
Poor diabetic control with A1c going above 8%. Patient has some mild confusion and reviewing his meds it looks like he stopped taking the glipizide somewhere around May.   It may have been because his A1c was so well-controlled but this point I am going to

## 2017-09-20 NOTE — PATIENT INSTRUCTIONS
Restart glipizied 5 g daily for diabetes.      Future Appointments  Date Time Provider Cat Nuno   10/12/2017 9:30 AM Aimee Gann MD [facility] 3 [facility]

## 2017-09-20 NOTE — PROGRESS NOTES
HPI:   Franck Stock is a 80year old male who presents for recheck of his diabetes. A1c 9.0, but looks like he stopped glipizide and is just on metformin.  Will restart glipizide ER 5  Patient presents with:  Leg Swellin week f/u     Influenza Vac 08/24/2017 02:49 PM   GFR 36 (L) 08/24/2017 02:49 PM         Wt Readings from Last 3 Encounters:  09/20/17 : 165 lb  08/21/17 : 165 lb  08/02/17 : 174 lb    BP Readings from Last 3 Encounters:  09/20/17 : 114/50  08/21/17 : 122/60  07/12/17 : 130/60  (H) 08/24/2017 02:49 PM   BUN 30 (H) 08/24/2017 02:49 PM   CREATSERUM 1.70 (H) 08/24/2017 02:49 PM   GFR 36 (L) 08/24/2017 02:49 PM   CA 10.2 08/24/2017 02:49 PM   ALKPHO 74 08/24/2017 02:49 PM   AST 34 08/24/2017 02:49 PM   ALT 71 (H) 08/24/2017 content normal. Cognition and memory are normal.       ASSESSMENT AND PLAN:   As for his Diabetes, it is borderline controlled. Recommendations are: Restart medications, see below.      As for his hypertension, Blood Pressure is well controlled, no sign clear change to explain rise, no meds for neuropathy         Current Assessment & Plan     stable         Relevant Medications    GlipiZIDE ER 5 MG Oral Tablet 24 Hr      Other Visit Diagnoses    None.         Return in about 4 weeks (around 10/18/2017) for

## 2017-09-21 ENCOUNTER — PATIENT OUTREACH (OUTPATIENT)
Dept: CASE MANAGEMENT | Age: 82
End: 2017-09-21

## 2017-09-21 DIAGNOSIS — E78.5 HYPERLIPIDEMIA WITH TARGET LDL LESS THAN 70: ICD-10-CM

## 2017-09-21 DIAGNOSIS — N18.30 CKD (CHRONIC KIDNEY DISEASE) STAGE 3, GFR 30-59 ML/MIN (HCC): ICD-10-CM

## 2017-09-21 DIAGNOSIS — E11.40 TYPE 2 DIABETES MELLITUS WITH DIABETIC NEUROPATHY, WITHOUT LONG-TERM CURRENT USE OF INSULIN (HCC): Chronic | ICD-10-CM

## 2017-09-21 PROCEDURE — 99490 CHRNC CARE MGMT STAFF 1ST 20: CPT

## 2017-09-21 NOTE — PROGRESS NOTES
Pt's daughter called to give me an update regarding pt. Pt recently saw his pcp and was advised to restart his glipizide. Daughter takes care of pt's medications.  She stated that there are days that pt forgets to take his meds and thinks it's ok because it

## 2017-10-12 ENCOUNTER — APPOINTMENT (OUTPATIENT)
Dept: LAB | Age: 82
End: 2017-10-12
Attending: FAMILY MEDICINE
Payer: MEDICARE

## 2017-10-12 ENCOUNTER — OFFICE VISIT (OUTPATIENT)
Dept: FAMILY MEDICINE CLINIC | Facility: CLINIC | Age: 82
End: 2017-10-12

## 2017-10-12 VITALS
BODY MASS INDEX: 24 KG/M2 | TEMPERATURE: 97 F | SYSTOLIC BLOOD PRESSURE: 120 MMHG | RESPIRATION RATE: 14 BRPM | HEART RATE: 96 BPM | WEIGHT: 174 LBS | DIASTOLIC BLOOD PRESSURE: 50 MMHG

## 2017-10-12 DIAGNOSIS — N18.30 CONTROLLED TYPE 2 DIABETES MELLITUS WITH STAGE 3 CHRONIC KIDNEY DISEASE, WITHOUT LONG-TERM CURRENT USE OF INSULIN (HCC): Primary | Chronic | ICD-10-CM

## 2017-10-12 DIAGNOSIS — E11.40 TYPE 2 DIABETES MELLITUS WITH DIABETIC NEUROPATHY, WITHOUT LONG-TERM CURRENT USE OF INSULIN (HCC): Chronic | ICD-10-CM

## 2017-10-12 DIAGNOSIS — E11.22 CONTROLLED TYPE 2 DIABETES MELLITUS WITH STAGE 3 CHRONIC KIDNEY DISEASE, WITHOUT LONG-TERM CURRENT USE OF INSULIN (HCC): Primary | Chronic | ICD-10-CM

## 2017-10-12 DIAGNOSIS — E78.5 HYPERLIPIDEMIA WITH TARGET LDL LESS THAN 70: ICD-10-CM

## 2017-10-12 DIAGNOSIS — N18.30 CKD (CHRONIC KIDNEY DISEASE) STAGE 3, GFR 30-59 ML/MIN (HCC): ICD-10-CM

## 2017-10-12 PROCEDURE — 99214 OFFICE O/P EST MOD 30 MIN: CPT | Performed by: FAMILY MEDICINE

## 2017-10-12 PROCEDURE — 36415 COLL VENOUS BLD VENIPUNCTURE: CPT

## 2017-10-12 PROCEDURE — 83036 HEMOGLOBIN GLYCOSYLATED A1C: CPT

## 2017-10-12 PROCEDURE — 80053 COMPREHEN METABOLIC PANEL: CPT

## 2017-10-12 NOTE — PROGRESS NOTES
HPI:   Anand Delaney is a 80year old male who presents for recheck of his diabetes.   Last A1c 9.0% in August, now FBS in  level, testing 3x daily b.o fluctuating BS wit poor prognosis, A1c down to 7.85, continue to folloe  Patient presents with: 01/13/2016 09:56 AM   BUN 22 (H) 10/12/2017 10:31 AM   CREATSERUM 1.55 (H) 10/12/2017 10:31 AM   GFR 40 (L) 10/12/2017 10:31 AM         Wt Readings from Last 3 Encounters:  10/12/17 : 174 lb  09/20/17 : 165 lb  08/21/17 : 165 lb    BP Readings from Last 3 (MULTIVITAMIN OR) daily. No current facility-administered medications on file prior to visit.          REVIEW OF SYSTEMS:   Review of Systems GENERAL HEALTH: feels well otherwise  SKIN: denies any unusual skin lesions or rashes  RESPIRATORY: denies shor controlled, no significant medication side effects noted.    PLAN: will continue present medications, reviewed diet, exercise and weight control, and labs as ordered     As for his cholesterol, Lipids are well controlled, no significant medication side effe

## 2017-10-31 ENCOUNTER — PATIENT OUTREACH (OUTPATIENT)
Dept: CASE MANAGEMENT | Age: 82
End: 2017-10-31

## 2017-10-31 NOTE — PROGRESS NOTES
Coordination of education, review of chart, update to pt record, compilation and mailing resources/materials: Flu education, Why get the flu vaccine, and request to get flu vaccine with PCP and or [facility] locations.   Time: 5 min    Total monthly time 5 minutes

## 2017-12-08 ENCOUNTER — PATIENT OUTREACH (OUTPATIENT)
Dept: CASE MANAGEMENT | Age: 82
End: 2017-12-08

## 2017-12-08 NOTE — PROGRESS NOTES
[facility] [place] [phone] Contacting patient to introduce my self as his Chronic care manager.

## 2017-12-11 ENCOUNTER — PATIENT OUTREACH (OUTPATIENT)
Dept: CASE MANAGEMENT | Age: 82
End: 2017-12-11

## 2017-12-11 DIAGNOSIS — N18.30 CONTROLLED TYPE 2 DIABETES MELLITUS WITH STAGE 3 CHRONIC KIDNEY DISEASE, WITHOUT LONG-TERM CURRENT USE OF INSULIN (HCC): Chronic | ICD-10-CM

## 2017-12-11 DIAGNOSIS — E11.22 CONTROLLED TYPE 2 DIABETES MELLITUS WITH STAGE 3 CHRONIC KIDNEY DISEASE, WITHOUT LONG-TERM CURRENT USE OF INSULIN (HCC): Chronic | ICD-10-CM

## 2017-12-11 DIAGNOSIS — E78.5 HYPERLIPIDEMIA WITH TARGET LDL LESS THAN 70: ICD-10-CM

## 2017-12-11 PROCEDURE — 99490 CHRNC CARE MGMT STAFF 1ST 20: CPT

## 2017-12-11 NOTE — PROGRESS NOTES
Spoke to Manuel Quintero at [facility] about CCM, HIPAA verified, current care plan and performed CCM assessment.  Reviewed pt Patient Active Problem List:     Hyperlipidemia with target LDL less than 70     Type 2 diabetes mellitus, controlled ([facility] [address]     Irritable bowel prevent compliance with medication regimen. At this time, no barriers reported. Marifer Loges reports is stable on all medications and denies experiencing any side effects. Care Team: Reviewed and updated.     Your appointments     Date & Time Appointment Depar

## 2017-12-20 ENCOUNTER — TELEPHONE (OUTPATIENT)
Dept: FAMILY MEDICINE CLINIC | Facility: CLINIC | Age: 82
End: 2017-12-20

## 2017-12-27 ENCOUNTER — TELEPHONE (OUTPATIENT)
Dept: FAMILY MEDICINE CLINIC | Facility: CLINIC | Age: 82
End: 2017-12-27

## 2017-12-27 NOTE — TELEPHONE ENCOUNTER
Has had diarrhea from yesterday worse starting at 06:30 has been going about every 30 min I discussed clear liquids only for the next 24 hours then if diarrhea abates he can start the BRAT diet and I went over the componets of this. I explained the reason b

## 2018-01-19 ENCOUNTER — PATIENT OUTREACH (OUTPATIENT)
Dept: CASE MANAGEMENT | Age: 83
End: 2018-01-19

## 2018-01-19 DIAGNOSIS — E11.40 TYPE 2 DIABETES MELLITUS WITH DIABETIC NEUROPATHY, WITHOUT LONG-TERM CURRENT USE OF INSULIN (HCC): Chronic | ICD-10-CM

## 2018-01-19 DIAGNOSIS — N18.30 CONTROLLED TYPE 2 DIABETES MELLITUS WITH STAGE 3 CHRONIC KIDNEY DISEASE, WITHOUT LONG-TERM CURRENT USE OF INSULIN (HCC): Chronic | ICD-10-CM

## 2018-01-19 DIAGNOSIS — E78.5 HYPERLIPIDEMIA WITH TARGET LDL LESS THAN 70: ICD-10-CM

## 2018-01-19 DIAGNOSIS — E11.22 CONTROLLED TYPE 2 DIABETES MELLITUS WITH STAGE 3 CHRONIC KIDNEY DISEASE, WITHOUT LONG-TERM CURRENT USE OF INSULIN (HCC): Chronic | ICD-10-CM

## 2018-01-19 DIAGNOSIS — M48.061 SPINAL STENOSIS, LUMBAR REGION, WITHOUT NEUROGENIC CLAUDICATION: Chronic | ICD-10-CM

## 2018-01-19 PROCEDURE — 99490 CHRNC CARE MGMT STAFF 1ST 20: CPT

## 2018-01-19 NOTE — PROGRESS NOTES
Spoke to Carleen Mendes at [facility] about CCM, HIPAA verified, current care plan and performed CCM assessment.  Reviewed pt Patient Active Problem List:     Chronic right-sided low back pain without sciatica     Hyperlipidemia with target LDL less than 70     Type 2 effects.     Your appointments     Date & Time Appointment Department [facility])    Feb 15, 2018 10:00 AM CST Exam - Established Patient with Derik Tineo MD [facility], [address], [place]  ([address])        [facility]

## 2018-01-23 DIAGNOSIS — N18.30 CONTROLLED TYPE 2 DIABETES MELLITUS WITH STAGE 3 CHRONIC KIDNEY DISEASE, WITHOUT LONG-TERM CURRENT USE OF INSULIN (HCC): ICD-10-CM

## 2018-01-23 DIAGNOSIS — E11.22 CONTROLLED TYPE 2 DIABETES MELLITUS WITH STAGE 3 CHRONIC KIDNEY DISEASE, WITHOUT LONG-TERM CURRENT USE OF INSULIN (HCC): ICD-10-CM

## 2018-01-29 DIAGNOSIS — N18.30 CONTROLLED TYPE 2 DIABETES MELLITUS WITH STAGE 3 CHRONIC KIDNEY DISEASE, WITHOUT LONG-TERM CURRENT USE OF INSULIN (HCC): ICD-10-CM

## 2018-01-29 DIAGNOSIS — E11.22 CONTROLLED TYPE 2 DIABETES MELLITUS WITH STAGE 3 CHRONIC KIDNEY DISEASE, WITHOUT LONG-TERM CURRENT USE OF INSULIN (HCC): ICD-10-CM

## 2018-01-30 ENCOUNTER — TELEPHONE (OUTPATIENT)
Dept: FAMILY MEDICINE CLINIC | Facility: CLINIC | Age: 83
End: 2018-01-30

## 2018-01-30 DIAGNOSIS — N18.30 CONTROLLED TYPE 2 DIABETES MELLITUS WITH STAGE 3 CHRONIC KIDNEY DISEASE, WITHOUT LONG-TERM CURRENT USE OF INSULIN (HCC): ICD-10-CM

## 2018-01-30 DIAGNOSIS — E11.22 CONTROLLED TYPE 2 DIABETES MELLITUS WITH STAGE 3 CHRONIC KIDNEY DISEASE, WITHOUT LONG-TERM CURRENT USE OF INSULIN (HCC): ICD-10-CM

## 2018-01-30 NOTE — TELEPHONE ENCOUNTER
[facility] called and stated they received an electronic request for TOMAS CONTOUR NEXT TEST In Vitro Strip from us. . However, pharmacy needs office to re send refill request with diagnosis codes and answer wether patient is on insulin yes or no.

## 2018-02-14 ENCOUNTER — PATIENT OUTREACH (OUTPATIENT)
Dept: CASE MANAGEMENT | Age: 83
End: 2018-02-14

## 2018-02-15 ENCOUNTER — OFFICE VISIT (OUTPATIENT)
Dept: FAMILY MEDICINE CLINIC | Facility: CLINIC | Age: 83
End: 2018-02-15

## 2018-02-15 VITALS
SYSTOLIC BLOOD PRESSURE: 118 MMHG | WEIGHT: 173 LBS | TEMPERATURE: 97 F | RESPIRATION RATE: 14 BRPM | HEIGHT: 73 IN | BODY MASS INDEX: 22.93 KG/M2 | HEART RATE: 80 BPM | DIASTOLIC BLOOD PRESSURE: 82 MMHG

## 2018-02-15 DIAGNOSIS — E11.22 CONTROLLED TYPE 2 DIABETES MELLITUS WITH STAGE 3 CHRONIC KIDNEY DISEASE, WITHOUT LONG-TERM CURRENT USE OF INSULIN (HCC): Chronic | ICD-10-CM

## 2018-02-15 DIAGNOSIS — E78.5 HYPERLIPIDEMIA WITH TARGET LDL LESS THAN 70: ICD-10-CM

## 2018-02-15 DIAGNOSIS — N18.30 CKD (CHRONIC KIDNEY DISEASE) STAGE 3, GFR 30-59 ML/MIN (HCC): ICD-10-CM

## 2018-02-15 DIAGNOSIS — R21 FACIAL RASH: ICD-10-CM

## 2018-02-15 DIAGNOSIS — E11.40 TYPE 2 DIABETES MELLITUS WITH DIABETIC NEUROPATHY, WITHOUT LONG-TERM CURRENT USE OF INSULIN (HCC): Primary | Chronic | ICD-10-CM

## 2018-02-15 DIAGNOSIS — N18.30 CONTROLLED TYPE 2 DIABETES MELLITUS WITH STAGE 3 CHRONIC KIDNEY DISEASE, WITHOUT LONG-TERM CURRENT USE OF INSULIN (HCC): Chronic | ICD-10-CM

## 2018-02-15 PROCEDURE — 99214 OFFICE O/P EST MOD 30 MIN: CPT | Performed by: FAMILY MEDICINE

## 2018-02-15 NOTE — PROGRESS NOTES
HPI:   Sandra Collins is a 80year old male who presents for recheck of his diabetes. DOing well, wife with worsening memory. [facility] next month.      Patient presents with:  Diabetes: 4 month f/u     There are no preventive care reminders to display for this 10:31 AM         Wt Readings from Last 3 Encounters:  02/15/18 : 173 lb  01/11/18 : 175 lb  12/14/17 : 175 lb    BP Readings from Last 3 Encounters:  02/15/18 : 118/82  01/11/18 : 122/58  10/12/17 : 120/50        HPI     Past History:   He  has a past medi OR) daily. No current facility-administered medications on file prior to visit.        REVIEW OF SYSTEMS:   Review of Systems GENERAL HEALTH: feels well otherwise  SKIN: denies any unusual skin lesions or rashes  RESPIRATORY: denies shortness of breath controlled, no significant medication side effects noted.    PLAN: will continue present medications, reviewed diet, exercise and weight control, and labs as ordered     As for his cholesterol, Lipids are well controlled, no significant medication side effe

## 2018-02-21 ENCOUNTER — PATIENT OUTREACH (OUTPATIENT)
Dept: CASE MANAGEMENT | Age: 83
End: 2018-02-21

## 2018-02-21 DIAGNOSIS — E78.5 HYPERLIPIDEMIA WITH TARGET LDL LESS THAN 70: ICD-10-CM

## 2018-02-21 DIAGNOSIS — H25.813 COMBINED FORMS OF AGE-RELATED CATARACT, BILATERAL: ICD-10-CM

## 2018-02-21 DIAGNOSIS — N18.30 CONTROLLED TYPE 2 DIABETES MELLITUS WITH STAGE 3 CHRONIC KIDNEY DISEASE, WITHOUT LONG-TERM CURRENT USE OF INSULIN (HCC): Chronic | ICD-10-CM

## 2018-02-21 DIAGNOSIS — E11.40 TYPE 2 DIABETES MELLITUS WITH DIABETIC NEUROPATHY, WITHOUT LONG-TERM CURRENT USE OF INSULIN (HCC): Chronic | ICD-10-CM

## 2018-02-21 DIAGNOSIS — E11.22 CONTROLLED TYPE 2 DIABETES MELLITUS WITH STAGE 3 CHRONIC KIDNEY DISEASE, WITHOUT LONG-TERM CURRENT USE OF INSULIN (HCC): Chronic | ICD-10-CM

## 2018-02-21 PROCEDURE — 99490 CHRNC CARE MGMT STAFF 1ST 20: CPT

## 2018-02-21 NOTE — PROGRESS NOTES
2/21/2018  Spoke to Raheem Riojas for CCM.       Updates to patient care team/ comments:  UP TO DATE  Patient reported changes in medications:  None  Med Adherence      Health Maintenance:   Fall Risk Screening due on 07/12/2018  Annual Depression Screen due on 07 Alzheimer's support group for patient. Time Spent This Encounter Total: 25 min medical record review, telephone communication, care plan updates where needed, education, goals and action plan recreation/update.  Provided acknowledgment and validation to

## 2018-03-23 ENCOUNTER — PATIENT OUTREACH (OUTPATIENT)
Dept: CASE MANAGEMENT | Age: 83
End: 2018-03-23

## 2018-03-23 DIAGNOSIS — E78.5 HYPERLIPIDEMIA WITH TARGET LDL LESS THAN 70: ICD-10-CM

## 2018-03-23 DIAGNOSIS — N18.30 CONTROLLED TYPE 2 DIABETES MELLITUS WITH STAGE 3 CHRONIC KIDNEY DISEASE, WITHOUT LONG-TERM CURRENT USE OF INSULIN (HCC): Chronic | ICD-10-CM

## 2018-03-23 DIAGNOSIS — E11.22 CONTROLLED TYPE 2 DIABETES MELLITUS WITH STAGE 3 CHRONIC KIDNEY DISEASE, WITHOUT LONG-TERM CURRENT USE OF INSULIN (HCC): Chronic | ICD-10-CM

## 2018-03-23 PROCEDURE — 99490 CHRNC CARE MGMT STAFF 1ST 20: CPT

## 2018-03-23 NOTE — PROGRESS NOTES
[facility]     Time Spent This Encounter Total: 1 min medical record review, telephone communication, care plan updates where needed, and education. Provided acknowledgment and validation to patient's concerns.      Monthly Minute Total including today: 1

## 2018-03-27 NOTE — PROGRESS NOTES
[facility]     Time Spent This Encounter Total:1 min medical record review, telephone communication, care plan updates where needed, and education. Provided acknowledgment and validation to patient's concerns.      Monthly Minute Total including today: 2

## 2018-03-27 NOTE — PROGRESS NOTES
Spoke to Sosa Erazo verified for CCM call.     Medical History  Patient Active Problem List:     Chronic right-sided low back pain without sciatica     Hyperlipidemia with target LDL less than 70     Type 2 diabetes mellitus, controlled ([facility] [address]     Irritab times.   - Plan for overcoming all barriers:  Spoke to patient about assisted living programs for him and his wife, patient will speak with his kids about it. Advised patient to take glucose monitor to [place] and demonstrate how he checks his blood.  Patient silverio

## 2018-03-29 ENCOUNTER — TELEPHONE (OUTPATIENT)
Dept: FAMILY MEDICINE CLINIC | Facility: CLINIC | Age: 83
End: 2018-03-29

## 2018-03-29 NOTE — TELEPHONE ENCOUNTER
Received fax from [facility] requesting Progress Notes that should be related to the supplies listed ,signed and dated within 6 mo PRIOR to DOS 10/12/17 (form in [place])

## 2018-04-20 ENCOUNTER — PATIENT OUTREACH (OUTPATIENT)
Dept: CASE MANAGEMENT | Age: 83
End: 2018-04-20

## 2018-04-20 DIAGNOSIS — E78.5 HYPERLIPIDEMIA WITH TARGET LDL LESS THAN 70: ICD-10-CM

## 2018-04-20 DIAGNOSIS — M48.061 SPINAL STENOSIS, LUMBAR REGION, WITHOUT NEUROGENIC CLAUDICATION: Chronic | ICD-10-CM

## 2018-04-20 DIAGNOSIS — N18.30 CONTROLLED TYPE 2 DIABETES MELLITUS WITH STAGE 3 CHRONIC KIDNEY DISEASE, WITHOUT LONG-TERM CURRENT USE OF INSULIN (HCC): Chronic | ICD-10-CM

## 2018-04-20 DIAGNOSIS — E11.22 CONTROLLED TYPE 2 DIABETES MELLITUS WITH STAGE 3 CHRONIC KIDNEY DISEASE, WITHOUT LONG-TERM CURRENT USE OF INSULIN (HCC): Chronic | ICD-10-CM

## 2018-04-20 DIAGNOSIS — E11.40 TYPE 2 DIABETES MELLITUS WITH DIABETIC NEUROPATHY, WITHOUT LONG-TERM CURRENT USE OF INSULIN (HCC): Chronic | ICD-10-CM

## 2018-04-20 PROCEDURE — 99490 CHRNC CARE MGMT STAFF 1ST 20: CPT

## 2018-04-23 PROBLEM — B35.1 DERMATOPHYTOSIS, NAIL: Status: ACTIVE | Noted: 2018-04-23

## 2018-04-24 NOTE — PROGRESS NOTES
Spoke to CinsaySosa verified for CCM call.     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 - Plan for overcoming all barriers: He will check it first thing in the morning and log it daily. Patient agrees to goal action plan.   Self-Management Abilities             (1) least confident in ach

## 2018-05-22 ENCOUNTER — PATIENT OUTREACH (OUTPATIENT)
Dept: CASE MANAGEMENT | Age: 83
End: 2018-05-22

## 2018-05-22 DIAGNOSIS — N18.30 CONTROLLED TYPE 2 DIABETES MELLITUS WITH STAGE 3 CHRONIC KIDNEY DISEASE, WITHOUT LONG-TERM CURRENT USE OF INSULIN (HCC): Chronic | ICD-10-CM

## 2018-05-22 DIAGNOSIS — E78.5 HYPERLIPIDEMIA WITH TARGET LDL LESS THAN 70: ICD-10-CM

## 2018-05-22 DIAGNOSIS — E11.22 CONTROLLED TYPE 2 DIABETES MELLITUS WITH STAGE 3 CHRONIC KIDNEY DISEASE, WITHOUT LONG-TERM CURRENT USE OF INSULIN (HCC): Chronic | ICD-10-CM

## 2018-05-22 DIAGNOSIS — M48.061 SPINAL STENOSIS, LUMBAR REGION, WITHOUT NEUROGENIC CLAUDICATION: Chronic | ICD-10-CM

## 2018-05-22 DIAGNOSIS — M51.26 DISPLACEMENT OF LUMBAR INTERVERTEBRAL DISC WITHOUT MYELOPATHY: ICD-10-CM

## 2018-05-22 DIAGNOSIS — E11.40 TYPE 2 DIABETES MELLITUS WITH DIABETIC NEUROPATHY, WITHOUT LONG-TERM CURRENT USE OF INSULIN (HCC): Chronic | ICD-10-CM

## 2018-05-22 PROCEDURE — 99490 CHRNC CARE MGMT STAFF 1ST 20: CPT

## 2018-05-22 NOTE — PROGRESS NOTES
[facility] calling patient to follow up on CCM    Time Spent This Encounter Total: 2 min medical record review, telephone communication, care plan updates where needed, and education. Provided acknowledgment and validation to patient's concerns.      Monthly Nirmala

## 2018-05-23 NOTE — PROGRESS NOTES
Spoke to [employer] verified for CCM call.     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     • Patient Reported New Barriers

## 2018-06-12 LAB
AMB EXT CHOLESTEROL, TOTAL: 155 MG/DL
AMB EXT CREATININE: 1.57 MG/DL
AMB EXT HDL CHOLESTEROL: 92 MG/DL
AMB EXT HEMATOCRIT: 35.7
AMB EXT HEMOGLOBIN: 11.7
AMB EXT HGBA1C: 6.4 %
AMB EXT LDL CHOLESTEROL, DIRECT: 49 MG/DL
AMB EXT MCV: 96.2
AMB EXT PLATELETS: 162
AMB EXT TRIGLYCERIDES: 69 MG/DL
AMB EXT WBC: 5.7 X10(3)UL

## 2018-06-21 ENCOUNTER — PATIENT OUTREACH (OUTPATIENT)
Dept: CASE MANAGEMENT | Age: 83
End: 2018-06-21

## 2018-06-21 DIAGNOSIS — E78.5 HYPERLIPIDEMIA WITH TARGET LDL LESS THAN 70: ICD-10-CM

## 2018-06-21 DIAGNOSIS — G89.29 CHRONIC RIGHT-SIDED LOW BACK PAIN WITHOUT SCIATICA: ICD-10-CM

## 2018-06-21 DIAGNOSIS — M54.50 CHRONIC RIGHT-SIDED LOW BACK PAIN WITHOUT SCIATICA: ICD-10-CM

## 2018-06-21 DIAGNOSIS — N18.30 CONTROLLED TYPE 2 DIABETES MELLITUS WITH STAGE 3 CHRONIC KIDNEY DISEASE, WITHOUT LONG-TERM CURRENT USE OF INSULIN (HCC): Chronic | ICD-10-CM

## 2018-06-21 DIAGNOSIS — E11.22 CONTROLLED TYPE 2 DIABETES MELLITUS WITH STAGE 3 CHRONIC KIDNEY DISEASE, WITHOUT LONG-TERM CURRENT USE OF INSULIN (HCC): Chronic | ICD-10-CM

## 2018-06-21 NOTE — PROGRESS NOTES
Calling patient to follow up CCM    Time Spent This Encounter Total: 2 min medical record review, telephone communication, care plan updates where needed, and education. Provided acknowledgment and validation to patient's concerns.      Monthly Minute Total

## 2018-06-25 NOTE — PROGRESS NOTES
Spoke to Sosa Henry verified for CCM call.     Medical History  Patient Active Problem List:     Chronic right-sided low back pain without sciatica     Hyperlipidemia with target LDL less than 70     Type 2 diabetes mellitus, controlled ([facility] [address]     Irritab exercises that were given to him during PT years ago. Patient is unable to walk or stand for long periods of time due to back pain.     Previous goal met:progressing    Self Management Goals/Action Plan:    • Goal from previous outreach: diabetes

## 2018-06-30 PROCEDURE — 99490 CHRNC CARE MGMT STAFF 1ST 20: CPT

## 2018-07-09 ENCOUNTER — TELEPHONE (OUTPATIENT)
Dept: FAMILY MEDICINE CLINIC | Facility: CLINIC | Age: 83
End: 2018-07-09

## 2018-07-13 ENCOUNTER — OFFICE VISIT (OUTPATIENT)
Dept: FAMILY MEDICINE CLINIC | Facility: CLINIC | Age: 83
End: 2018-07-13

## 2018-07-13 VITALS
TEMPERATURE: 97 F | BODY MASS INDEX: 23.59 KG/M2 | SYSTOLIC BLOOD PRESSURE: 108 MMHG | RESPIRATION RATE: 14 BRPM | WEIGHT: 178 LBS | DIASTOLIC BLOOD PRESSURE: 56 MMHG | HEART RATE: 76 BPM | HEIGHT: 73 IN

## 2018-07-13 DIAGNOSIS — T22.052A: ICD-10-CM

## 2018-07-13 DIAGNOSIS — E11.22 CONTROLLED TYPE 2 DIABETES MELLITUS WITH STAGE 3 CHRONIC KIDNEY DISEASE, WITHOUT LONG-TERM CURRENT USE OF INSULIN (HCC): Chronic | ICD-10-CM

## 2018-07-13 DIAGNOSIS — N18.30 CKD (CHRONIC KIDNEY DISEASE) STAGE 3, GFR 30-59 ML/MIN (HCC): ICD-10-CM

## 2018-07-13 DIAGNOSIS — E11.40 TYPE 2 DIABETES MELLITUS WITH DIABETIC NEUROPATHY, WITHOUT LONG-TERM CURRENT USE OF INSULIN (HCC): Chronic | ICD-10-CM

## 2018-07-13 DIAGNOSIS — E78.5 HYPERLIPIDEMIA WITH TARGET LDL LESS THAN 70: ICD-10-CM

## 2018-07-13 DIAGNOSIS — IMO0001 MILD AORTIC SCLEROSIS: ICD-10-CM

## 2018-07-13 DIAGNOSIS — N18.30 CONTROLLED TYPE 2 DIABETES MELLITUS WITH STAGE 3 CHRONIC KIDNEY DISEASE, WITHOUT LONG-TERM CURRENT USE OF INSULIN (HCC): Chronic | ICD-10-CM

## 2018-07-13 DIAGNOSIS — Z00.00 ENCOUNTER FOR ANNUAL HEALTH EXAMINATION: ICD-10-CM

## 2018-07-13 DIAGNOSIS — Z00.00 ANNUAL PHYSICAL EXAM: Primary | ICD-10-CM

## 2018-07-13 PROCEDURE — 99213 OFFICE O/P EST LOW 20 MIN: CPT | Performed by: FAMILY MEDICINE

## 2018-07-13 PROCEDURE — G0439 PPPS, SUBSEQ VISIT: HCPCS | Performed by: FAMILY MEDICINE

## 2018-07-13 NOTE — PROGRESS NOTES
HPI:   Clarissa Harris is a 80year old male who presents for a Medicare Subsequent Annual Wellness visit (Pt already had Initial Annual Wellness). Lots of caregiver issues with wife progressing.   His last annual assessment has been over 1 year: Magnolia Garnica II diabetes mellitus ([facility] [address]     Venous insufficiency of both lower extremities     CKD (chronic kidney disease) stage 3, GFR 30-59 ml/min (HCC)     Spondylolisthesis, grade 1     Mild aortic sclerosis (HCC)     Combined forms of age-related cataract, bilate He  has a past medical history of CKD (chronic kidney disease) stage 3, GFR 30-59 ml/min ([facility]) (2/12/2016); Diabetes mellitus ([facility] [address] Hypercholesterolemia; and Irritable bowel syndrome.     He  has a past surgical history that includes colonoscopy (2000 20 preoperative order for iv antibiotic surgical site infection prophylaxis. (N/A, 6/21/2016); and patient documented not to have experienced any of the following events (N/A, 6/21/2016).     His family history includes Cancer in his sister; Diabetes in his mo and time. He appears well-developed and well-nourished. No distress. HENT:   Head: Normocephalic. Right Ear: Hearing, tympanic membrane, external ear and ear canal normal. No middle ear effusion.    Left Ear: Hearing, tympanic membrane, external ear and 09/21/2015, 10/12/2016, 09/01/2017   • Pneumococcal (Prevnar 13) 06/10/2015   • Pneumovax 23 01/01/1997, 10/22/1997        ASSESSMENT AND OTHER RELEVANT CHRONIC CONDITIONS:   Maribell Maciel is a 80year old male who presents for a Medicare Assessment. his Diabetes, it is well controlled, no significant medication side effects noted. Recommendations are: continue present meds, lose weight by increased dietary compliance and exercise and will check labs as ordered.       Lab Results  Component Value Da your daily physical activity?: Light  How would you describe your current health state?: Fair  How do you maintain positive mental well-being?: Social Interaction; Visiting Friends    This section provided for quick review of chart, separate sheet to omar No vaccine history found Medium/high risk factors:   End-stage renal disease   Hemophiliacs who received Factor VIII or IX concentrates   Clients of institutions for the mentally retarded   Persons who live in the same house as a HepB virus carrier   [facility]

## 2018-07-13 NOTE — PATIENT INSTRUCTIONS
Ziyad Bueno's SCREENING SCHEDULE   Tests on this list are recommended by your physician but may not be covered, or covered at this frequency, by your insurer. Please check with your insurance carrier before scheduling to verify coverage.     PREVENT Colorectal Cancer Screening Covered up to Age 76     Colonoscopy Screen   Covered every 10 years- more often if abnormal There are no preventive care reminders to display for this patient.  Update [employer] if applicable    Flex Sigmoidoscopy Scr Toxoid- Only covered with a cut with metal- TD and TDaP Not covered by Medicare Part B) No orders found for this or any previous visit.  This may be covered with your prescription benefits, but Medicare does not cover unless Medically needed    Zoster (Not

## 2018-07-23 ENCOUNTER — PATIENT OUTREACH (OUTPATIENT)
Dept: CASE MANAGEMENT | Age: 83
End: 2018-07-23

## 2018-07-23 DIAGNOSIS — N18.30 CONTROLLED TYPE 2 DIABETES MELLITUS WITH STAGE 3 CHRONIC KIDNEY DISEASE, WITHOUT LONG-TERM CURRENT USE OF INSULIN (HCC): Chronic | ICD-10-CM

## 2018-07-23 DIAGNOSIS — E11.40 TYPE 2 DIABETES MELLITUS WITH DIABETIC NEUROPATHY, WITHOUT LONG-TERM CURRENT USE OF INSULIN (HCC): Chronic | ICD-10-CM

## 2018-07-23 DIAGNOSIS — E78.5 HYPERLIPIDEMIA WITH TARGET LDL LESS THAN 70: ICD-10-CM

## 2018-07-23 DIAGNOSIS — E11.22 CONTROLLED TYPE 2 DIABETES MELLITUS WITH STAGE 3 CHRONIC KIDNEY DISEASE, WITHOUT LONG-TERM CURRENT USE OF INSULIN (HCC): Chronic | ICD-10-CM

## 2018-07-23 NOTE — PROGRESS NOTES
Spoke to Sosa Hilario for CCM call.     Medical History  Patient Active Problem List:     Chronic right-sided low back pain without sciatica     Hyperlipidemia with target LDL less than 70     Type 2 diabetes mellitus, controlled ([facility] [address]     Irritab Goals/Action Plan:    • Goal from previous outreach:  diabetes                        • Patient reported progress toward goal: patient has been following a portion control          Patient agrees to goal action plan.   Self-Management Abilities : low carb d

## 2018-07-31 PROCEDURE — 99490 CHRNC CARE MGMT STAFF 1ST 20: CPT

## 2018-08-27 ENCOUNTER — PATIENT OUTREACH (OUTPATIENT)
Dept: CASE MANAGEMENT | Age: 83
End: 2018-08-27

## 2018-08-27 DIAGNOSIS — E11.40 TYPE 2 DIABETES MELLITUS WITH DIABETIC NEUROPATHY, WITHOUT LONG-TERM CURRENT USE OF INSULIN (HCC): Chronic | ICD-10-CM

## 2018-08-27 DIAGNOSIS — M48.061 SPINAL STENOSIS, LUMBAR REGION, WITHOUT NEUROGENIC CLAUDICATION: Chronic | ICD-10-CM

## 2018-08-27 DIAGNOSIS — E11.22 CONTROLLED TYPE 2 DIABETES MELLITUS WITH STAGE 3 CHRONIC KIDNEY DISEASE, WITHOUT LONG-TERM CURRENT USE OF INSULIN (HCC): Chronic | ICD-10-CM

## 2018-08-27 DIAGNOSIS — E78.5 HYPERLIPIDEMIA WITH TARGET LDL LESS THAN 70: ICD-10-CM

## 2018-08-27 DIAGNOSIS — N18.30 CONTROLLED TYPE 2 DIABETES MELLITUS WITH STAGE 3 CHRONIC KIDNEY DISEASE, WITHOUT LONG-TERM CURRENT USE OF INSULIN (HCC): Chronic | ICD-10-CM

## 2018-08-27 NOTE — PROGRESS NOTES
Spoke to Sosa Ivory verified for CCM call.     Medical History  Patient Active Problem List:     Chronic right-sided low back pain without sciatica     Hyperlipidemia with target LDL less than 70     Type 2 diabetes mellitus, controlled ([facility] [address]     Irritab times a week 10 sets of 3    Back injection is starting to wear out. States his back has been feeling achy. Patient has appt. with Dr. Moira Doan for another injection next week.     Flu vaccine completed at [facility] July 2018    Previous goal met: maintaining    S

## 2018-08-31 PROCEDURE — 99490 CHRNC CARE MGMT STAFF 1ST 20: CPT

## 2018-09-08 ENCOUNTER — HOSPITAL ENCOUNTER (OUTPATIENT)
Dept: MRI IMAGING | Age: 83
Discharge: HOME | End: 2018-09-08
Attending: PHYSICAL MEDICINE & REHABILITATION
Payer: MEDICARE

## 2018-09-08 DIAGNOSIS — M54.50 CHRONIC RIGHT-SIDED LOW BACK PAIN WITHOUT SCIATICA: ICD-10-CM

## 2018-09-08 DIAGNOSIS — M48.061 SPINAL STENOSIS, LUMBAR REGION, WITHOUT NEUROGENIC CLAUDICATION: Chronic | ICD-10-CM

## 2018-09-08 DIAGNOSIS — M51.26 DISPLACEMENT OF LUMBAR INTERVERTEBRAL DISC WITHOUT MYELOPATHY: ICD-10-CM

## 2018-09-08 DIAGNOSIS — M43.10 SPONDYLOLISTHESIS, GRADE 1: ICD-10-CM

## 2018-09-08 DIAGNOSIS — G89.29 CHRONIC RIGHT-SIDED LOW BACK PAIN WITHOUT SCIATICA: ICD-10-CM

## 2018-09-08 PROCEDURE — 72148 MRI LUMBAR SPINE W/O DYE: CPT | Performed by: PHYSICAL MEDICINE & REHABILITATION

## 2018-09-24 ENCOUNTER — PATIENT OUTREACH (OUTPATIENT)
Dept: CASE MANAGEMENT | Age: 83
End: 2018-09-24

## 2018-09-24 DIAGNOSIS — M48.061 SPINAL STENOSIS, LUMBAR REGION, WITHOUT NEUROGENIC CLAUDICATION: Chronic | ICD-10-CM

## 2018-09-24 DIAGNOSIS — E78.5 HYPERLIPIDEMIA WITH TARGET LDL LESS THAN 70: ICD-10-CM

## 2018-09-24 DIAGNOSIS — M51.26 DISPLACEMENT OF LUMBAR INTERVERTEBRAL DISC WITHOUT MYELOPATHY: ICD-10-CM

## 2018-09-24 DIAGNOSIS — E11.40 TYPE 2 DIABETES MELLITUS WITH DIABETIC NEUROPATHY, WITHOUT LONG-TERM CURRENT USE OF INSULIN (HCC): Chronic | ICD-10-CM

## 2018-09-24 NOTE — PROGRESS NOTES
Spoke to Sosa Brown for CCM call.     Medical History  Patient Active Problem List:     Chronic right-sided low back pain without sciatica     Hyperlipidemia with target LDL less than 70     Type 2 diabetes mellitus, controlled ([facility] [address]     Irritab was 134 last week.     Previous goal met: continue with previous goal    Self Management Goals/Action Plan:    • Goal from previous outreach: Diet and exercise                        • Patient reported progress toward goal: Continues with at home stretching

## 2018-09-30 PROCEDURE — 99490 CHRNC CARE MGMT STAFF 1ST 20: CPT

## 2018-10-18 ENCOUNTER — PATIENT OUTREACH (OUTPATIENT)
Dept: CASE MANAGEMENT | Age: 83
End: 2018-10-18

## 2018-10-18 DIAGNOSIS — E78.5 HYPERLIPIDEMIA WITH TARGET LDL LESS THAN 70: ICD-10-CM

## 2018-10-18 DIAGNOSIS — E11.22 CONTROLLED TYPE 2 DIABETES MELLITUS WITH STAGE 3 CHRONIC KIDNEY DISEASE, WITHOUT LONG-TERM CURRENT USE OF INSULIN (HCC): Chronic | ICD-10-CM

## 2018-10-18 DIAGNOSIS — E11.40 TYPE 2 DIABETES MELLITUS WITH DIABETIC NEUROPATHY, WITHOUT LONG-TERM CURRENT USE OF INSULIN (HCC): Chronic | ICD-10-CM

## 2018-10-18 DIAGNOSIS — N18.30 CONTROLLED TYPE 2 DIABETES MELLITUS WITH STAGE 3 CHRONIC KIDNEY DISEASE, WITHOUT LONG-TERM CURRENT USE OF INSULIN (HCC): Chronic | ICD-10-CM

## 2018-10-18 NOTE — PROGRESS NOTES
Contacting patient to follow up on CCM for the month. L[facility]B    Time Spent This Encounter Total: 2min medical record review, telephone communication, care plan updates where needed, and education. Provided acknowledgment and validation to patient's concerns.

## 2018-10-19 NOTE — PROGRESS NOTES
Spoke to [employer]osa verified for CCM call.     Medical History  Patient Active Problem List:     Chronic right-sided low back pain without sciatica     Hyperlipidemia with target LDL less than 70     Type 2 diabetes mellitus, controlled ([facility] [address]     Irritab Goals/Action Plan:    · Goal from previous outreach: Diet and exercise                        · Patient reported progress toward goal: Continues with at home stretching and strengthen, he does them three times a week 10 sets of 3     · Patient Reported New

## 2018-10-31 PROCEDURE — 99490 CHRNC CARE MGMT STAFF 1ST 20: CPT

## 2018-11-15 ENCOUNTER — PATIENT OUTREACH (OUTPATIENT)
Dept: CASE MANAGEMENT | Age: 83
End: 2018-11-15

## 2018-11-15 DIAGNOSIS — N18.30 CONTROLLED TYPE 2 DIABETES MELLITUS WITH STAGE 3 CHRONIC KIDNEY DISEASE, WITHOUT LONG-TERM CURRENT USE OF INSULIN (HCC): Chronic | ICD-10-CM

## 2018-11-15 DIAGNOSIS — M48.061 SPINAL STENOSIS, LUMBAR REGION, WITHOUT NEUROGENIC CLAUDICATION: Chronic | ICD-10-CM

## 2018-11-15 DIAGNOSIS — E11.22 CONTROLLED TYPE 2 DIABETES MELLITUS WITH STAGE 3 CHRONIC KIDNEY DISEASE, WITHOUT LONG-TERM CURRENT USE OF INSULIN (HCC): Chronic | ICD-10-CM

## 2018-11-15 DIAGNOSIS — E78.5 HYPERLIPIDEMIA WITH TARGET LDL LESS THAN 70: ICD-10-CM

## 2018-11-15 NOTE — PROGRESS NOTES
Spoke to [employer]osa verified for CCM call.     Medical History  Patient Active Problem List:     Chronic right-sided low back pain without sciatica     Hyperlipidemia with target LDL less than 70     Type 2 diabetes mellitus, controlled ([facility] [address]     Irritab been running between  states he folows a low carb,fat and sodium diet.      Previous goal met:Progressing    Self Management Goals/Action Plan:    · Goal from previous outreach: Diet and exercise     · Patient reported progress toward goal: Continues

## 2018-11-30 PROCEDURE — 99490 CHRNC CARE MGMT STAFF 1ST 20: CPT

## 2018-12-11 ENCOUNTER — TELEPHONE (OUTPATIENT)
Dept: FAMILY MEDICINE CLINIC | Facility: CLINIC | Age: 83
End: 2018-12-11

## 2018-12-11 ENCOUNTER — OFFICE VISIT (OUTPATIENT)
Dept: FAMILY MEDICINE CLINIC | Facility: CLINIC | Age: 83
End: 2018-12-11
Payer: MEDICARE

## 2018-12-11 VITALS
HEART RATE: 80 BPM | DIASTOLIC BLOOD PRESSURE: 50 MMHG | SYSTOLIC BLOOD PRESSURE: 110 MMHG | TEMPERATURE: 97 F | WEIGHT: 175.63 LBS | HEIGHT: 72.5 IN | RESPIRATION RATE: 16 BRPM | BODY MASS INDEX: 23.53 KG/M2

## 2018-12-11 DIAGNOSIS — N18.30 CONTROLLED TYPE 2 DIABETES MELLITUS WITH STAGE 3 CHRONIC KIDNEY DISEASE, WITHOUT LONG-TERM CURRENT USE OF INSULIN (HCC): Chronic | ICD-10-CM

## 2018-12-11 DIAGNOSIS — E11.22 CONTROLLED TYPE 2 DIABETES MELLITUS WITH STAGE 3 CHRONIC KIDNEY DISEASE, WITHOUT LONG-TERM CURRENT USE OF INSULIN (HCC): Chronic | ICD-10-CM

## 2018-12-11 DIAGNOSIS — R73.9 ELEVATED BLOOD SUGAR: Primary | ICD-10-CM

## 2018-12-11 PROCEDURE — 99213 OFFICE O/P EST LOW 20 MIN: CPT | Performed by: PHYSICIAN ASSISTANT

## 2018-12-11 PROCEDURE — 83036 HEMOGLOBIN GLYCOSYLATED A1C: CPT | Performed by: PHYSICIAN ASSISTANT

## 2018-12-11 NOTE — PATIENT INSTRUCTIONS
Blood sugars are likely being elevated due to late night snacking. Recommend that you stop eating sweets/ carbohydrates after 730 pm. If you want to snack later, try to eat protein snacks (meat, eggs).     Check your blood sugars at different times of the

## 2018-12-11 NOTE — TELEPHONE ENCOUNTER
Pt c/o getting elevated BP readings since 12/5/18. Denies headache or vision changes. Pt is diabetic. Appt scheduled today. Pt is asked to bring his BP monitor so that we can compare it to our own.

## 2018-12-12 PROBLEM — M47.817 LUMBOSACRAL SPONDYLOSIS WITHOUT MYELOPATHY: Status: ACTIVE | Noted: 2018-12-12

## 2018-12-12 NOTE — PROGRESS NOTES
Patient presents with:  Blood Sugar: Blood sugars have been running high over the past week. HISTORY OF PRESENT ILLNESS  Renea Redd is a 80year old male who presents for evaluation of elevated blood sugars.  Pietro Gupta generally only checks his blood metRONIDAZOLE 0.75 % External Cream, Apply 1 Application topically 2 (two) times daily. , Disp: 45 g, Rfl: 2    Patient has no known allergies.     Patient Active Problem List:     Chronic right-sided low back pain without sciatica     Hyperlipidemia with ta Location: [facility]   • Injection, anesthetic/steroid, transforaminal epidural; lumbar/sacral, single level Bilateral 2/26/2015    Procedure: TRANSFORAMINAL EPIDURAL - LUMBAR;  Surgeon: Traci Pritchett DO;  Location: [facility] surgical site infection prophylaxis. 9/24/2013    Procedure: HIP INJECTION (PAIN); Surgeon: Chito Turner MD;  Location: [facility]   • Patient withough preoperative order for iv antibiotic surgical site infection prophylaxis.  Right ASSESSMENT/ PLAN  (R73.9) Elevated blood sugar  (primary encounter diagnosis)  Plan: Suspect that his fasting glucose readings are elevated due to late nighttime snacking. Recommend avoiding carbohydrates after dinner.  If he wants a snack in the late

## 2018-12-13 ENCOUNTER — TELEPHONE (OUTPATIENT)
Dept: FAMILY MEDICINE CLINIC | Facility: CLINIC | Age: 83
End: 2018-12-13

## 2018-12-13 NOTE — TELEPHONE ENCOUNTER
Received fax from [facility] fax number [phone]    Received a fax requesting  Shoulder/Elbow/Wrist/Hand Orthosis. Checked with patient he does not recall a need for this.  Also checked with Dr. Hortencia Conde office and they did not order this for pat

## 2018-12-18 ENCOUNTER — PATIENT OUTREACH (OUTPATIENT)
Dept: CASE MANAGEMENT | Age: 83
End: 2018-12-18

## 2018-12-27 NOTE — PROGRESS NOTES
Contacting patient to follow up on CCM for the month. Spoke to Dolliver states its not a good time right now. Asked for a call later today.     Time Spent This Encounter Total:2 min medical record review, telephone communication, care plan updates where neede

## 2019-01-04 LAB
AMB EXT CHOLESTEROL, TOTAL: 110 MG/DL
AMB EXT CREATININE: 1.71 MG/DL
AMB EXT HDL CHOLESTEROL: 72 MG/DL
AMB EXT HGBA1C: 7.9 %
AMB EXT LDL CHOLESTEROL, DIRECT: 25 MG/DL
AMB EXT TRIGLYCERIDES: 66 MG/DL

## 2019-01-08 ENCOUNTER — PATIENT OUTREACH (OUTPATIENT)
Dept: CASE MANAGEMENT | Age: 84
End: 2019-01-08

## 2019-01-08 DIAGNOSIS — M47.817 LUMBOSACRAL SPONDYLOSIS WITHOUT MYELOPATHY: ICD-10-CM

## 2019-01-08 DIAGNOSIS — E78.5 HYPERLIPIDEMIA WITH TARGET LDL LESS THAN 70: ICD-10-CM

## 2019-01-08 DIAGNOSIS — E11.22 CONTROLLED TYPE 2 DIABETES MELLITUS WITH STAGE 3 CHRONIC KIDNEY DISEASE, WITHOUT LONG-TERM CURRENT USE OF INSULIN (HCC): Chronic | ICD-10-CM

## 2019-01-08 DIAGNOSIS — N18.30 CONTROLLED TYPE 2 DIABETES MELLITUS WITH STAGE 3 CHRONIC KIDNEY DISEASE, WITHOUT LONG-TERM CURRENT USE OF INSULIN (HCC): Chronic | ICD-10-CM

## 2019-01-09 NOTE — PROGRESS NOTES
Spoke to Mont[employer]osa verified for CCM call.     Medical History  Patient Active Problem List:     Chronic right-sided low back pain without sciatica     Hyperlipidemia with target LDL less than 70     Type 2 diabetes mellitus, controlled ([facility] [address]     Irritab met:Progressing    Self Management Goals/Action Plan:    • Goal from previous outreach: Diet and exercise                        • Patient reported progress toward goal: States he is following a low carb diet    • Patient Reported New Barriers And Concerns

## 2019-01-10 ENCOUNTER — OFFICE VISIT (OUTPATIENT)
Dept: FAMILY MEDICINE CLINIC | Facility: CLINIC | Age: 84
End: 2019-01-10
Payer: MEDICARE

## 2019-01-10 VITALS
HEIGHT: 72 IN | TEMPERATURE: 97 F | HEART RATE: 60 BPM | BODY MASS INDEX: 23.03 KG/M2 | RESPIRATION RATE: 12 BRPM | WEIGHT: 170 LBS | DIASTOLIC BLOOD PRESSURE: 60 MMHG | SYSTOLIC BLOOD PRESSURE: 110 MMHG

## 2019-01-10 DIAGNOSIS — E78.5 HYPERLIPIDEMIA WITH TARGET LDL LESS THAN 70: ICD-10-CM

## 2019-01-10 DIAGNOSIS — N18.30 CKD (CHRONIC KIDNEY DISEASE) STAGE 3, GFR 30-59 ML/MIN (HCC): ICD-10-CM

## 2019-01-10 DIAGNOSIS — M75.82 ROTATOR CUFF TENDINITIS, LEFT: ICD-10-CM

## 2019-01-10 DIAGNOSIS — E11.22 CONTROLLED TYPE 2 DIABETES MELLITUS WITH STAGE 3 CHRONIC KIDNEY DISEASE, WITHOUT LONG-TERM CURRENT USE OF INSULIN (HCC): Primary | ICD-10-CM

## 2019-01-10 DIAGNOSIS — N18.30 CONTROLLED TYPE 2 DIABETES MELLITUS WITH STAGE 3 CHRONIC KIDNEY DISEASE, WITHOUT LONG-TERM CURRENT USE OF INSULIN (HCC): Primary | ICD-10-CM

## 2019-01-10 DIAGNOSIS — E11.40 TYPE 2 DIABETES MELLITUS WITH DIABETIC NEUROPATHY, WITHOUT LONG-TERM CURRENT USE OF INSULIN (HCC): Chronic | ICD-10-CM

## 2019-01-10 PROCEDURE — 99214 OFFICE O/P EST MOD 30 MIN: CPT | Performed by: FAMILY MEDICINE

## 2019-01-10 NOTE — PROGRESS NOTES
HPI:   Patient presents with:  Diabetes: f/u     Nimesh Serrano is a 80year old male who presents for recheck of his diabetes.   Subjective    Worsening condition of wife and we are seeing more elevation of sugars at [place]  Lab Results   Component Value Da left side. Posterior tibial pulses are 2+ on the right side, and 2+ on the left side. Edema not present. Pulmonary/Chest: Effort normal and breath sounds normal. No respiratory distress. He has no wheezes. Abdominal: Soft.  Bowel sounds are nor .    Lab Results   Component Value Date    A1C 7.9 01/04/2019    A1C 6.6 (A) 12/11/2018      Blood Sugar Medications          GlipiZIDE ER 5 MG Oral Tablet 24 Hr    MetFORMIN HCl (GLUCOPHAGE) 850 MG Oral Tab                   Diabetic neuropathy, type II d

## 2019-01-11 NOTE — ASSESSMENT & PLAN NOTE
As for his Diabetes, it is no significant medication side effects noted, borderline controlled.      Recommendations are: continue present meds, lose weight by increased dietary compliance and exercise and will check labs as ordered   Better numbers last mo

## 2019-01-21 DIAGNOSIS — N18.30 CKD (CHRONIC KIDNEY DISEASE) STAGE 3, GFR 30-59 ML/MIN (HCC): ICD-10-CM

## 2019-01-21 DIAGNOSIS — I87.2 VENOUS INSUFFICIENCY OF BOTH LOWER EXTREMITIES: ICD-10-CM

## 2019-01-24 RX ORDER — TORSEMIDE 20 MG/1
TABLET ORAL
Qty: 120 TABLET | Refills: 3 | Status: SHIPPED | OUTPATIENT
Start: 2019-01-24 | End: 2019-03-06

## 2019-01-24 NOTE — TELEPHONE ENCOUNTER
Medication refilled per protocol.      Requested Prescriptions     Signed Prescriptions Disp Refills   • TORSEMIDE 20 MG Oral Tab 120 tablet 3     Sig: take 2 tablets by mouth twice daily     Authorizing Provider: Shania Bentley     Ordering User: Pepe Grover

## 2019-01-31 PROCEDURE — 99490 CHRNC CARE MGMT STAFF 1ST 20: CPT

## 2019-02-04 ENCOUNTER — PATIENT OUTREACH (OUTPATIENT)
Dept: CASE MANAGEMENT | Age: 84
End: 2019-02-04

## 2019-02-04 DIAGNOSIS — E78.5 HYPERLIPIDEMIA WITH TARGET LDL LESS THAN 70: ICD-10-CM

## 2019-02-04 DIAGNOSIS — N18.30 CONTROLLED TYPE 2 DIABETES MELLITUS WITH STAGE 3 CHRONIC KIDNEY DISEASE, WITHOUT LONG-TERM CURRENT USE OF INSULIN (HCC): Chronic | ICD-10-CM

## 2019-02-04 DIAGNOSIS — E11.22 CONTROLLED TYPE 2 DIABETES MELLITUS WITH STAGE 3 CHRONIC KIDNEY DISEASE, WITHOUT LONG-TERM CURRENT USE OF INSULIN (HCC): Chronic | ICD-10-CM

## 2019-02-04 DIAGNOSIS — E11.40 TYPE 2 DIABETES MELLITUS WITH DIABETIC NEUROPATHY, WITHOUT LONG-TERM CURRENT USE OF INSULIN (HCC): Chronic | ICD-10-CM

## 2019-02-04 NOTE — PROGRESS NOTES
Spoke to Sosa Templeton for CCM call.     Medical History  Patient Active Problem List:     Chronic right-sided low back pain without sciatica     Hyperlipidemia with target LDL less than 70     Type 2 diabetes mellitus, controlled ([facility] [address]     Irritab carb diet     · Patient Reported New Barriers And Concerns: Patients wife is declining.                    - Plan for overcoming all barriers: Patient states he will sit down with his son and daughter and figure something that works best at home for him an

## 2019-02-28 PROCEDURE — 99490 CHRNC CARE MGMT STAFF 1ST 20: CPT

## 2019-03-04 ENCOUNTER — TELEPHONE (OUTPATIENT)
Dept: FAMILY MEDICINE CLINIC | Facility: CLINIC | Age: 84
End: 2019-03-04

## 2019-03-04 DIAGNOSIS — R35.0 URINARY FREQUENCY: Primary | ICD-10-CM

## 2019-03-04 NOTE — TELEPHONE ENCOUNTER
Pt states that since last night he is having a hard time Urinating. Requesting a call back and or recommendation.

## 2019-03-04 NOTE — TELEPHONE ENCOUNTER
Had difficulty urinating had trouble starting stream and then it ends too soon and he is urinating frequently especially at night he is taking the TORSEMIDE 20 MG as directed.  I will send this to Dr Rehan Watson to review the problem

## 2019-03-04 NOTE — TELEPHONE ENCOUNTER
Called and told patietn to get UA done also can stop torsemide tomorrow he will do all this tomorrow

## 2019-03-05 ENCOUNTER — APPOINTMENT (OUTPATIENT)
Dept: LAB | Age: 84
End: 2019-03-05
Attending: FAMILY MEDICINE
Payer: MEDICARE

## 2019-03-05 DIAGNOSIS — R35.0 URINARY FREQUENCY: ICD-10-CM

## 2019-03-05 LAB
BILIRUB UR QL STRIP.AUTO: NEGATIVE
CLARITY UR REFRACT.AUTO: CLEAR
COLOR UR AUTO: YELLOW
GLUCOSE UR STRIP.AUTO-MCNC: 150 MG/DL
KETONES UR STRIP.AUTO-MCNC: NEGATIVE MG/DL
LEUKOCYTE ESTERASE UR QL STRIP.AUTO: NEGATIVE
NITRITE UR QL STRIP.AUTO: NEGATIVE
PH UR STRIP.AUTO: 6 [PH] (ref 4.5–8)
PROT UR STRIP.AUTO-MCNC: NEGATIVE MG/DL
RBC UR QL AUTO: NEGATIVE
SP GR UR STRIP.AUTO: 1.01 (ref 1–1.03)
UROBILINOGEN UR STRIP.AUTO-MCNC: <2 MG/DL

## 2019-03-05 PROCEDURE — 81003 URINALYSIS AUTO W/O SCOPE: CPT

## 2019-03-05 PROCEDURE — 87086 URINE CULTURE/COLONY COUNT: CPT

## 2019-03-06 ENCOUNTER — TELEPHONE (OUTPATIENT)
Dept: FAMILY MEDICINE CLINIC | Facility: CLINIC | Age: 84
End: 2019-03-06

## 2019-03-06 DIAGNOSIS — R35.89 FREQUENCY OF URINATION AND POLYURIA: Primary | ICD-10-CM

## 2019-03-06 DIAGNOSIS — R35.0 FREQUENCY OF URINATION AND POLYURIA: Primary | ICD-10-CM

## 2019-03-06 RX ORDER — FUROSEMIDE 40 MG/1
40 TABLET ORAL 2 TIMES DAILY
Qty: 180 TABLET | Refills: 3 | Status: SHIPPED | OUTPATIENT
Start: 2019-03-06 | End: 2020-07-20

## 2019-03-06 NOTE — TELEPHONE ENCOUNTER
Patient was issued a referral for a urologist as he is unable to urinate.  Patient was able to get an appointment this Friday but surgery won't happen until at least next week and it will be at [facility]. Patient is wondering if there is someone els

## 2019-03-06 NOTE — TELEPHONE ENCOUNTER
Please notify:    Requested Prescriptions     Signed Prescriptions Disp Refills   • furosemide 40 MG Oral Tab 180 tablet 3     Sig: Take 1 tablet (40 mg total) by mouth 2 (two) times daily.      Authorizing Provider: Rebecca Quezada to:      Stevan Conrad

## 2019-03-06 NOTE — TELEPHONE ENCOUNTER
Pharmacy stating that  Torsemide is on back order, they are currently giving pt 15 DS in the mean time, they are requesting an alternate medication.      Routed to Dr. Kervin Mendoza

## 2019-03-06 NOTE — TELEPHONE ENCOUNTER
Patient here with wife who has appt. Worsening urinary frequency over the past few days. Getting up at night several times. Becoming a big problem.

## 2019-03-06 NOTE — TELEPHONE ENCOUNTER
Attempted to call patient back to get more information, note 'NOT ABLE TO URINATE\". In reviewing patient's chart he called earlier today around noon and [facility]. No answer on patient's phone. Please try to reach patient.

## 2019-03-06 NOTE — TELEPHONE ENCOUNTER
Dr. Kelvin Ortiz see recent phone calls from patient today. Noticed recent urinalysis shows sugar in urine. Do you still want him to see urology?

## 2019-03-07 ENCOUNTER — TELEPHONE (OUTPATIENT)
Dept: FAMILY MEDICINE CLINIC | Facility: CLINIC | Age: 84
End: 2019-03-07

## 2019-03-07 DIAGNOSIS — N18.30 CKD (CHRONIC KIDNEY DISEASE) STAGE 3, GFR 30-59 ML/MIN (HCC): ICD-10-CM

## 2019-03-07 DIAGNOSIS — R35.0 INCREASED FREQUENCY OF URINATION: Primary | ICD-10-CM

## 2019-03-07 NOTE — TELEPHONE ENCOUNTER
Patient was referred to Dr. Giovanna Jung for urinary issues. He is scheduled to see him tomorrow at his [facility] location. Patient was told by his  that after tomorrow Dr. Giovanna Jung will be at the [place] location for the rest of the month.  Patient i

## 2019-03-08 NOTE — TELEPHONE ENCOUNTER
Front Staff, please call the patient and advise Melody Salazar has issued a referral to another urologist in [facility], Tara Dong MD [facility] [address] [place] [phone]

## 2019-03-08 NOTE — TELEPHONE ENCOUNTER
This is fine. I cannot sign the order because I can’t log into epic on my laptop. I’m using my phone which is limited.

## 2019-03-09 NOTE — TELEPHONE ENCOUNTER
Tried to call patient to give new referral information, phone just rang and rang, unable to leave message.

## 2019-03-11 ENCOUNTER — PATIENT OUTREACH (OUTPATIENT)
Dept: CASE MANAGEMENT | Age: 84
End: 2019-03-11

## 2019-03-11 DIAGNOSIS — E78.5 HYPERLIPIDEMIA WITH TARGET LDL LESS THAN 70: ICD-10-CM

## 2019-03-11 DIAGNOSIS — N18.30 CONTROLLED TYPE 2 DIABETES MELLITUS WITH STAGE 3 CHRONIC KIDNEY DISEASE, WITHOUT LONG-TERM CURRENT USE OF INSULIN (HCC): Chronic | ICD-10-CM

## 2019-03-11 DIAGNOSIS — N18.30 CKD (CHRONIC KIDNEY DISEASE) STAGE 3, GFR 30-59 ML/MIN (HCC): ICD-10-CM

## 2019-03-11 DIAGNOSIS — E11.22 CONTROLLED TYPE 2 DIABETES MELLITUS WITH STAGE 3 CHRONIC KIDNEY DISEASE, WITHOUT LONG-TERM CURRENT USE OF INSULIN (HCC): Chronic | ICD-10-CM

## 2019-03-11 NOTE — PROGRESS NOTES
Spoke to Sosa Hilario for CCM call.     Medical History  Patient Active Problem List:     Chronic right-sided low back pain without sciatica     Hyperlipidemia with target LDL less than 70     Type 2 diabetes mellitus, controlled ([facility] [address]     Irritab appt.    Previous goal met:Progressing     Self Management Goals/Action Plan:    · Goal from previous outreach: Diet and exercise     · Patient reported progress toward goal:Contniues with low carb diet and staying active at home caring for his wife stas Otero

## 2019-03-13 ENCOUNTER — TELEPHONE (OUTPATIENT)
Dept: FAMILY MEDICINE CLINIC | Facility: CLINIC | Age: 84
End: 2019-03-13

## 2019-03-13 NOTE — TELEPHONE ENCOUNTER
Informed pt of scheduling his Medicare Annual w/Dr Fausto Bradford due around 7/13/19.  He has an appt on 4/10/19 and will schedule at that time

## 2019-03-31 PROCEDURE — 99490 CHRNC CARE MGMT STAFF 1ST 20: CPT

## 2019-04-10 ENCOUNTER — OFFICE VISIT (OUTPATIENT)
Dept: FAMILY MEDICINE CLINIC | Facility: CLINIC | Age: 84
End: 2019-04-10
Payer: MEDICARE

## 2019-04-10 ENCOUNTER — TELEPHONE (OUTPATIENT)
Dept: FAMILY MEDICINE CLINIC | Facility: CLINIC | Age: 84
End: 2019-04-10

## 2019-04-10 VITALS
RESPIRATION RATE: 12 BRPM | BODY MASS INDEX: 22.16 KG/M2 | HEIGHT: 73 IN | HEART RATE: 72 BPM | WEIGHT: 167.19 LBS | DIASTOLIC BLOOD PRESSURE: 60 MMHG | SYSTOLIC BLOOD PRESSURE: 112 MMHG | TEMPERATURE: 97 F

## 2019-04-10 DIAGNOSIS — N18.30 CKD (CHRONIC KIDNEY DISEASE) STAGE 3, GFR 30-59 ML/MIN (HCC): Primary | ICD-10-CM

## 2019-04-10 DIAGNOSIS — E11.40 TYPE 2 DIABETES MELLITUS WITH DIABETIC NEUROPATHY, WITHOUT LONG-TERM CURRENT USE OF INSULIN (HCC): Chronic | ICD-10-CM

## 2019-04-10 DIAGNOSIS — E11.22 CONTROLLED TYPE 2 DIABETES MELLITUS WITH STAGE 3 CHRONIC KIDNEY DISEASE, WITHOUT LONG-TERM CURRENT USE OF INSULIN (HCC): Chronic | ICD-10-CM

## 2019-04-10 DIAGNOSIS — N18.30 CONTROLLED TYPE 2 DIABETES MELLITUS WITH STAGE 3 CHRONIC KIDNEY DISEASE, WITHOUT LONG-TERM CURRENT USE OF INSULIN (HCC): Chronic | ICD-10-CM

## 2019-04-10 DIAGNOSIS — E78.5 HYPERLIPIDEMIA WITH TARGET LDL LESS THAN 70: ICD-10-CM

## 2019-04-10 PROCEDURE — 85018 HEMOGLOBIN: CPT | Performed by: FAMILY MEDICINE

## 2019-04-10 PROCEDURE — 99214 OFFICE O/P EST MOD 30 MIN: CPT | Performed by: FAMILY MEDICINE

## 2019-04-10 RX ORDER — GABAPENTIN 300 MG/1
300 CAPSULE ORAL 3 TIMES DAILY
Qty: 90 CAPSULE | Refills: 11 | Status: SHIPPED | OUTPATIENT
Start: 2019-04-10 | End: 2019-06-14

## 2019-04-10 NOTE — PATIENT INSTRUCTIONS
Gabapentin for nerve pain:    Take 1 pill tonight, then 1 pill twice tomorrow. On third day you can take it 3 times a day. This can be used as needed, but do not stop suddenly if you are taking 3 or more pills a day.

## 2019-04-10 NOTE — PROGRESS NOTES
Patient presents with:  Diabetes: f/u       Subjective   HPI:   This is a 80year old male coming in for Diabetes Mellitus, worse control, nor pain in legs. Ongoing all day and night. Dr Norwood  did injections for sciatic without help.    HPI better urin is no deformity. Feet: diabetic foot exam performed    Right Foot:   Protective Sensation: 6 sites tested. 0 sites sensed. Skin Integrity: Negative for ulcer. Left Foot:   Protective Sensation: 6 sites tested. 0 sites sensed.    Skin Integrity: Kelvin Redmauricio consent    Return in about 3 months (around 7/10/2019) for diabetes follow up.     Adam Young MD, 4/10/2019, 12:19 PM

## 2019-04-10 NOTE — TELEPHONE ENCOUNTER
Pt was seen today by Dr Celine Thomas and he usually gets his meds from the [place] but now he wants Dr Celine Thomas to manage his medication he did talk to Dr Celine Thomas about this but then they got off subject so he wanted to make sure that we were aware that he will want his meds

## 2019-04-10 NOTE — TELEPHONE ENCOUNTER
Called and talked to pateint he is already on gabapentin (from [place])  300 mg q6h PRN and has only been taking 2 a day I told him to increase to TID and if not getting better to call us

## 2019-04-15 ENCOUNTER — PATIENT OUTREACH (OUTPATIENT)
Dept: CASE MANAGEMENT | Age: 84
End: 2019-04-15

## 2019-04-15 DIAGNOSIS — I87.2 VENOUS INSUFFICIENCY OF BOTH LOWER EXTREMITIES: ICD-10-CM

## 2019-04-15 DIAGNOSIS — N18.30 CONTROLLED TYPE 2 DIABETES MELLITUS WITH STAGE 3 CHRONIC KIDNEY DISEASE, WITHOUT LONG-TERM CURRENT USE OF INSULIN (HCC): Chronic | ICD-10-CM

## 2019-04-15 DIAGNOSIS — E11.22 CONTROLLED TYPE 2 DIABETES MELLITUS WITH STAGE 3 CHRONIC KIDNEY DISEASE, WITHOUT LONG-TERM CURRENT USE OF INSULIN (HCC): Chronic | ICD-10-CM

## 2019-04-15 DIAGNOSIS — E78.5 HYPERLIPIDEMIA WITH TARGET LDL LESS THAN 70: ICD-10-CM

## 2019-04-15 DIAGNOSIS — E11.40 TYPE 2 DIABETES MELLITUS WITH DIABETIC NEUROPATHY, WITHOUT LONG-TERM CURRENT USE OF INSULIN (HCC): Chronic | ICD-10-CM

## 2019-04-15 NOTE — PROGRESS NOTES
Spoke to [employer] verified for CCM call.     Medical History  Patient Active Problem List:     Chronic right-sided low back pain without sciatica     Hyperlipidemia with target LDL less than 70     Type 2 diabetes mellitus, controlled ([facility] [address]     Irritab agitated even if [facility] or caregivers are around wife gets confused. [place] has decided to switch over all his medical care from [place] to Dr. Sukhi Mendiola as commute is too long for him and at times finds himself avoiding doctors appointments.  He also worries about being

## 2019-04-29 NOTE — TELEPHONE ENCOUNTER
Referral PENDING for [facility] consideration, Dr Ata Spear 50 female here for chest discomfort with SOB, recently quit smoking, now on HRT with menorrhagia & clots, concerned for PE - patient is former ED PA at [employer]. Had labs imaging and EKG with reevaluation, found to have elevated d-dimer. Got CTA for PE with no acute findings, patient has outpatient appt on Wednesday with cardio but patient preferred more rapid workup and will dispo to [facility] for cardiac testing.

## 2019-04-30 PROCEDURE — 99490 CHRNC CARE MGMT STAFF 1ST 20: CPT

## 2019-05-09 DIAGNOSIS — E11.40 TYPE 2 DIABETES MELLITUS WITH DIABETIC NEUROPATHY, WITHOUT LONG-TERM CURRENT USE OF INSULIN (HCC): Chronic | ICD-10-CM

## 2019-05-09 RX ORDER — GABAPENTIN 400 MG/1
400 CAPSULE ORAL 3 TIMES DAILY
Qty: 270 CAPSULE | Refills: 4 | Status: SHIPPED | OUTPATIENT
Start: 2019-05-09 | End: 2022-01-14

## 2019-05-09 NOTE — TELEPHONE ENCOUNTER
Received a fax from pharmacy stating the pt sates that he should be taking 400 mg of Gabapentin not 300 mg. Please advise.     Routed to Dr. Rehan Watson

## 2019-05-13 DIAGNOSIS — E11.9 TYPE II DIABETES MELLITUS, WELL CONTROLLED (HCC): Chronic | ICD-10-CM

## 2019-05-14 ENCOUNTER — PATIENT OUTREACH (OUTPATIENT)
Dept: CASE MANAGEMENT | Age: 84
End: 2019-05-14

## 2019-05-14 DIAGNOSIS — E11.22 CONTROLLED TYPE 2 DIABETES MELLITUS WITH STAGE 3 CHRONIC KIDNEY DISEASE, WITHOUT LONG-TERM CURRENT USE OF INSULIN (HCC): Chronic | ICD-10-CM

## 2019-05-14 DIAGNOSIS — M47.817 LUMBOSACRAL SPONDYLOSIS WITHOUT MYELOPATHY: ICD-10-CM

## 2019-05-14 DIAGNOSIS — N18.30 CONTROLLED TYPE 2 DIABETES MELLITUS WITH STAGE 3 CHRONIC KIDNEY DISEASE, WITHOUT LONG-TERM CURRENT USE OF INSULIN (HCC): Chronic | ICD-10-CM

## 2019-05-14 DIAGNOSIS — E11.40 TYPE 2 DIABETES MELLITUS WITH DIABETIC NEUROPATHY, WITHOUT LONG-TERM CURRENT USE OF INSULIN (HCC): Chronic | ICD-10-CM

## 2019-05-14 DIAGNOSIS — E78.5 HYPERLIPIDEMIA WITH TARGET LDL LESS THAN 70: ICD-10-CM

## 2019-05-16 NOTE — PROGRESS NOTES
Spoke to PeeriusSosa verified for CCM call.     Medical History  Patient Active Problem List:     Chronic right-sided low back pain without sciatica     Hyperlipidemia with target LDL less than 70     Type 2 diabetes mellitus, controlled ([facility] [address]     Irritab do.    Continues to stay as active as possible. States he likes to take his wife out for walks when able. Heh as been working on cutting back on carbs states he believes he is doing well.     Previous goal met:Progressing     Self Management Goals/Action Pl

## 2019-05-23 ENCOUNTER — OFFICE VISIT (OUTPATIENT)
Dept: FAMILY MEDICINE CLINIC | Facility: CLINIC | Age: 84
End: 2019-05-23
Payer: MEDICARE

## 2019-05-23 VITALS
SYSTOLIC BLOOD PRESSURE: 110 MMHG | HEART RATE: 84 BPM | DIASTOLIC BLOOD PRESSURE: 52 MMHG | WEIGHT: 167.38 LBS | TEMPERATURE: 98 F | RESPIRATION RATE: 16 BRPM | BODY MASS INDEX: 22.18 KG/M2 | HEIGHT: 73 IN

## 2019-05-23 DIAGNOSIS — E11.40 CONTROLLED TYPE 2 DIABETES MELLITUS WITH DIABETIC NEUROPATHY, WITH LONG-TERM CURRENT USE OF INSULIN (HCC): ICD-10-CM

## 2019-05-23 DIAGNOSIS — M79.674 GREAT TOE PAIN, RIGHT: Primary | ICD-10-CM

## 2019-05-23 DIAGNOSIS — Z79.4 CONTROLLED TYPE 2 DIABETES MELLITUS WITH DIABETIC NEUROPATHY, WITH LONG-TERM CURRENT USE OF INSULIN (HCC): ICD-10-CM

## 2019-05-23 PROCEDURE — 99213 OFFICE O/P EST LOW 20 MIN: CPT | Performed by: PHYSICIAN ASSISTANT

## 2019-05-23 RX ORDER — PREDNISONE 20 MG/1
20 TABLET ORAL DAILY
Qty: 5 TABLET | Refills: 0 | Status: SHIPPED | OUTPATIENT
Start: 2019-05-23 | End: 2019-05-28

## 2019-05-24 ENCOUNTER — TELEPHONE (OUTPATIENT)
Dept: FAMILY MEDICINE CLINIC | Facility: CLINIC | Age: 84
End: 2019-05-24

## 2019-05-24 RX ORDER — CEPHALEXIN 250 MG/1
250 CAPSULE ORAL 4 TIMES DAILY
Qty: 28 CAPSULE | Refills: 0 | Status: SHIPPED | OUTPATIENT
Start: 2019-05-24 | End: 2019-05-31

## 2019-05-24 NOTE — TELEPHONE ENCOUNTER
Sent antibiotic (keflex) to pharmacy as he has not seen much improvement yet and we are going into a long weekend.     Thanks,  chavez

## 2019-05-24 NOTE — TELEPHONE ENCOUNTER
Patient outreach call placed. Patient states right great toe has very minimal improvement. Denies increase in pain or redness.

## 2019-05-24 NOTE — TELEPHONE ENCOUNTER
Called patient and informed him of new antibiotic and how to take it then call on Tuesday to let us know how this is doing

## 2019-05-25 NOTE — PROGRESS NOTES
CC: Right toe pain     HISTORY OF PRESENT ILLNESS  Erik Flores is a 80year old male who presents for evaluation of right great toe pain. He was seen by podiatry three days ago and felt that the pain started shortly after.  He thinks the nail was clip 5 mg by mouth nightly., Disp: , Rfl:   •  Multiple Vitamin (MULTIVITAMIN OR), daily. , Disp: , Rfl:   •  cephALEXin (KEFLEX) 250 MG Oral Cap, Take 1 capsule (250 mg total) by mouth 4 (four) times daily for 7 days. , Disp: 28 capsule, Rfl: 0    Patient has no Injection, anesthetic/steroid, transforaminal epidural; lumbar/sacral, single level Bilateral 2/26/2015    Procedure: TRANSFORAMINAL EPIDURAL - LUMBAR;  Surgeon: Darrick Palma DO;  Location: [facility]   • Injection, anesthetic/ster Procedure: LUMBAR / TRANSFORAMINAL EPIDURAL STEROID INJECTION;  Surgeon: Traci Pritchett DO;  Location: [address] [place] [address]   • Patient withough preoperative order for iv antibiotic surgical site infection prophylaxis.   9/24/2013    Procedure: HIP I nailbed.        ASSESSMENT/ PLAN  (E11.40,  Z79.4) Controlled type 2 diabetes mellitus with diabetic neuropathy, with long-term current use of insulin ([facility] [address]  (primary encounter diagnosis)  Right great toe pain  Plan: Suspect that this is an inflammatory art

## 2019-05-28 ENCOUNTER — TELEPHONE (OUTPATIENT)
Dept: FAMILY MEDICINE CLINIC | Facility: CLINIC | Age: 84
End: 2019-05-28

## 2019-05-28 NOTE — TELEPHONE ENCOUNTER
patient called now foot and toe are still swollen denies any pain able to walk OK still has 2 days of antibiotic to go he will call back if swelling does not get better

## 2019-05-31 PROCEDURE — 99490 CHRNC CARE MGMT STAFF 1ST 20: CPT

## 2019-06-12 ENCOUNTER — PATIENT OUTREACH (OUTPATIENT)
Dept: CASE MANAGEMENT | Age: 84
End: 2019-06-12

## 2019-06-12 DIAGNOSIS — E78.5 HYPERLIPIDEMIA WITH TARGET LDL LESS THAN 70: ICD-10-CM

## 2019-06-12 DIAGNOSIS — E11.21 CONTROLLED TYPE 2 DIABETES MELLITUS WITH DIABETIC NEPHROPATHY, WITHOUT LONG-TERM CURRENT USE OF INSULIN (HCC): Chronic | ICD-10-CM

## 2019-06-12 DIAGNOSIS — E11.40 TYPE 2 DIABETES MELLITUS WITH DIABETIC NEUROPATHY, WITHOUT LONG-TERM CURRENT USE OF INSULIN (HCC): Chronic | ICD-10-CM

## 2019-06-12 DIAGNOSIS — N18.30 CKD (CHRONIC KIDNEY DISEASE) STAGE 3, GFR 30-59 ML/MIN (HCC): ICD-10-CM

## 2019-06-13 NOTE — PROGRESS NOTES
Patient called states he received my message and wanted to let me know he will call later this afternoon as he very busy this morning.     Time Spent This Encounter Total: 2 min medical record review, telephone communication, care plan updates where needed,

## 2019-06-14 ENCOUNTER — TELEPHONE (OUTPATIENT)
Dept: FAMILY MEDICINE CLINIC | Facility: CLINIC | Age: 84
End: 2019-06-14

## 2019-06-14 NOTE — TELEPHONE ENCOUNTER
Has urinary catheter inserted and he will have this at least a week he was told to let Dr David Clay know about this.

## 2019-06-14 NOTE — PROGRESS NOTES
Spoke to Sosa Breaux for CCM call.     Medical History  Patient Active Problem List:     Chronic right-sided low back pain without sciatica     Hyperlipidemia with target LDL less than 70     Type 2 diabetes mellitus, controlled ([facility] [address]     Irritab reading have been ranging between 80 and 130. Celestino Glover      Previous goal met:Progressing     Self Management Goals/Action Plan:  · Goal from previous outreach: diet and exercise     · Patient reported progress toward goal: continues staying active states he is g

## 2019-06-18 ENCOUNTER — TELEPHONE (OUTPATIENT)
Dept: FAMILY MEDICINE CLINIC | Facility: CLINIC | Age: 84
End: 2019-06-18

## 2019-06-18 RX ORDER — ACETAMINOPHEN 500 MG
1000 TABLET ORAL ONCE
Status: CANCELLED | OUTPATIENT
Start: 2019-06-18 | End: 2019-06-18

## 2019-06-18 RX ORDER — SODIUM CHLORIDE, SODIUM LACTATE, POTASSIUM CHLORIDE, CALCIUM CHLORIDE 600; 310; 30; 20 MG/100ML; MG/100ML; MG/100ML; MG/100ML
INJECTION, SOLUTION INTRAVENOUS CONTINUOUS
Status: CANCELLED | OUTPATIENT
Start: 2019-06-18

## 2019-06-18 NOTE — TELEPHONE ENCOUNTER
Patient is calling to let Dr. Maria Eugenia Ladd know that he is scheduled for the catheter removal at [facility].

## 2019-06-19 ENCOUNTER — APPOINTMENT (OUTPATIENT)
Dept: LAB | Age: 84
End: 2019-06-19
Payer: MEDICARE

## 2019-06-19 DIAGNOSIS — M54.50 LUMBAGO: ICD-10-CM

## 2019-06-19 DIAGNOSIS — N40.1 BPH ASSOCIATED WITH NOCTURIA: ICD-10-CM

## 2019-06-19 DIAGNOSIS — R35.1 BPH ASSOCIATED WITH NOCTURIA: ICD-10-CM

## 2019-06-19 DIAGNOSIS — M79.609 PAIN IN LIMB: ICD-10-CM

## 2019-06-19 PROCEDURE — 93005 ELECTROCARDIOGRAM TRACING: CPT

## 2019-06-19 PROCEDURE — 36415 COLL VENOUS BLD VENIPUNCTURE: CPT

## 2019-06-19 PROCEDURE — 93010 ELECTROCARDIOGRAM REPORT: CPT | Performed by: INTERNAL MEDICINE

## 2019-06-19 PROCEDURE — 80048 BASIC METABOLIC PNL TOTAL CA: CPT

## 2019-06-19 RX ORDER — TERAZOSIN 5 MG/1
5 CAPSULE ORAL NIGHTLY
Qty: 90 CAPSULE | Refills: 0 | Status: ON HOLD | OUTPATIENT
Start: 2019-06-19 | End: 2019-07-10

## 2019-06-19 RX ORDER — SIMVASTATIN 80 MG
80 TABLET ORAL NIGHTLY
Qty: 90 TABLET | Refills: 0 | Status: SHIPPED | OUTPATIENT
Start: 2019-06-19 | End: 2019-10-13

## 2019-06-19 NOTE — TELEPHONE ENCOUNTER
Fax received from [employer] requesting refills of Simvastatin 80 mg and Terazosin. LOV 5/23/19.    Future Appointments   Date Time Provider Cat Nuno   6/26/2019  7:00 AM MD Francisco Zendejas   7/10/2019 12:15 PM Erich Zavaleta MD EMG 3 EM

## 2019-06-21 ENCOUNTER — TELEPHONE (OUTPATIENT)
Dept: FAMILY MEDICINE CLINIC | Facility: CLINIC | Age: 84
End: 2019-06-21

## 2019-06-21 NOTE — TELEPHONE ENCOUNTER
Patient called requesting to speak with nurse wants to make sure Dr Jet King knew of his procedure 06/26 to get catheter removed with Dr Casitllo Shearer, wants to know if Dr Jet King knows about it and if gave the okay to do procedure

## 2019-06-24 ENCOUNTER — ANESTHESIA EVENT (OUTPATIENT)
Dept: SURGERY | Facility: HOSPITAL | Age: 84
End: 2019-06-24
Payer: MEDICARE

## 2019-06-24 ENCOUNTER — TELEPHONE (OUTPATIENT)
Dept: FAMILY MEDICINE CLINIC | Facility: CLINIC | Age: 84
End: 2019-06-24

## 2019-06-24 NOTE — TELEPHONE ENCOUNTER
Called and talked to patient he has had EKG and CMP done Dr Nimesh Forrester is aware of surgery that is scheduled no other notification from Dr León Zhou for surgery if OK with Dr Eric Mccollum did not do physical or read EKG

## 2019-06-24 NOTE — TELEPHONE ENCOUNTER
Patient is scheduled for 6/26 for procedure and wants to make sure Dr. Jose J Rodriguez is made aware of it.  Patient requested to speak to Deidre Gonzalez to find out if he needed to have anything done prior to his procedure

## 2019-06-24 NOTE — PAT NURSING NOTE
Abnormal Ekg reviewed by /anesthesia. He requests note of medical clearance pre op. Faxed request to and spoke w Curtis ivey. She will give message to MD. Also faxed request to surgeon as [employer].

## 2019-06-25 ENCOUNTER — TELEPHONE (OUTPATIENT)
Dept: FAMILY MEDICINE CLINIC | Facility: CLINIC | Age: 84
End: 2019-06-25

## 2019-06-25 NOTE — TELEPHONE ENCOUNTER
Patient contacting office wanting to speak to nurse Laquita Downs. I let patient know it is noted that he needs to schedule an appointment.  Patient got upset stated he does not need feel it is neccessary for him to come in for an appointment when has been speaking t

## 2019-06-25 NOTE — TELEPHONE ENCOUNTER
Talked to nany Wood was aware of the surgery but not the EKG which just came yesterday and was abnormal and we did not order it so never read it

## 2019-06-25 NOTE — TELEPHONE ENCOUNTER
Patient had an EKG done 06/19/19 that came back abnormal, patient is to have surgery 06/26/19, patient states Dr. Trenton Knutson was made aware of all this and that he's cleared for surgery but with the abnormal EKG Dr Igor Garcia office looking for clarification

## 2019-06-25 NOTE — TELEPHONE ENCOUNTER
Called and talked to patient and explained that we did not order the EKG so had no way of knowing that it was abnormal and so he will need to be seen by Dr Miguel Bone to address the abnormal EKG made appointment for 7/3/19 with Dr Miguel Bone

## 2019-06-25 NOTE — PAT NURSING NOTE
Spoke w Avani Decker (pcp) office re: need for pre op clearance. Per office-is not back in office until Thursday. Pt  will need f/u appt before he can obtain clearance.  Left message on v.m. of Sonia/surgery scheduler for  informing her pt will not be

## 2019-06-26 ENCOUNTER — ANESTHESIA (OUTPATIENT)
Dept: SURGERY | Facility: HOSPITAL | Age: 84
End: 2019-06-26
Payer: MEDICARE

## 2019-06-27 ENCOUNTER — TELEPHONE (OUTPATIENT)
Dept: FAMILY MEDICINE CLINIC | Facility: CLINIC | Age: 84
End: 2019-06-27

## 2019-06-27 NOTE — TELEPHONE ENCOUNTER
EKG preoperatively for a transurethral resection of the prostate, EKG done on 6/24/2019, shows new left bundle branch block with previous comparison from October 30, 2003. He is having no symptoms and this is preoperative.   He had a essentially normal e

## 2019-06-29 ENCOUNTER — OFFICE VISIT (OUTPATIENT)
Dept: FAMILY MEDICINE CLINIC | Facility: CLINIC | Age: 84
End: 2019-06-29
Payer: MEDICARE

## 2019-06-29 VITALS
TEMPERATURE: 97 F | WEIGHT: 157.63 LBS | BODY MASS INDEX: 20.89 KG/M2 | RESPIRATION RATE: 14 BRPM | SYSTOLIC BLOOD PRESSURE: 118 MMHG | DIASTOLIC BLOOD PRESSURE: 72 MMHG | HEART RATE: 84 BPM | HEIGHT: 73 IN

## 2019-06-29 DIAGNOSIS — M79.89 SWOLLEN FINGER: ICD-10-CM

## 2019-06-29 DIAGNOSIS — R30.0 DYSURIA: Primary | ICD-10-CM

## 2019-06-29 DIAGNOSIS — M54.50 ACUTE BILATERAL LOW BACK PAIN WITHOUT SCIATICA: ICD-10-CM

## 2019-06-29 PROCEDURE — 81003 URINALYSIS AUTO W/O SCOPE: CPT | Performed by: PHYSICIAN ASSISTANT

## 2019-06-29 PROCEDURE — 87186 SC STD MICRODIL/AGAR DIL: CPT | Performed by: PHYSICIAN ASSISTANT

## 2019-06-29 PROCEDURE — 87077 CULTURE AEROBIC IDENTIFY: CPT | Performed by: PHYSICIAN ASSISTANT

## 2019-06-29 PROCEDURE — 99213 OFFICE O/P EST LOW 20 MIN: CPT | Performed by: PHYSICIAN ASSISTANT

## 2019-06-29 PROCEDURE — 87086 URINE CULTURE/COLONY COUNT: CPT | Performed by: PHYSICIAN ASSISTANT

## 2019-06-29 RX ORDER — SULFAMETHOXAZOLE AND TRIMETHOPRIM 400; 80 MG/1; MG/1
1 TABLET ORAL 2 TIMES DAILY
Qty: 10 TABLET | Refills: 0 | Status: SHIPPED | OUTPATIENT
Start: 2019-06-29 | End: 2019-07-04

## 2019-06-29 NOTE — PROGRESS NOTES
CC: low back pain     HISTORY OF PRESENT ILLNESS  Erik Flores is a 80year old male who presents for evaluation of low back pain. Started about 3 days ago, worst last night into this morning. Has been noticing some burning with urination.  Does not fe ([facility]     Venous insufficiency of both lower extremities     CKD (chronic kidney disease) stage 3, GFR 30-59 ml/min (HCC)     Spondylolisthesis, grade 1     Mild aortic sclerosis (HCC)     Combined forms of age-related cataract, bilateral     Dermatophytos [facility]   • Injection, w/wo contrast, dx/therapeutic substance, epidural/subarachnoid; lumbar/sacral N/A 6/21/2016    Procedure: LUMBAR / TRANSFORAMINAL EPIDURAL STEROID INJECTION;  Surgeon: David Ziegler DO;  Location: [facility] SURGICAL CE 2/26/2015    Procedure: TRANSFORAMINAL EPIDURAL - LUMBAR;  Surgeon: Starr Joyner DO;  Location: [address] [place] [address]   • Patient withough preoperative order for iv antibiotic surgical site infection prophylaxis.  Bilateral 8/11/2015    Proced Multistix Lot# 06/29/2019 [id number]  Numeric Final   • Multistix Expiration Date 06/29/2019 11/30/19  Date Final       ASSESSMENT/ PLAN  (R30.0) Dysuria, catheter associated (primary encounter diagnosis)  Low back pain  Swollen finger  Plan: UA consistent wi

## 2019-06-30 PROCEDURE — 99490 CHRNC CARE MGMT STAFF 1ST 20: CPT

## 2019-07-03 ENCOUNTER — HOSPITAL ENCOUNTER (OUTPATIENT)
Dept: CV DIAGNOSTICS | Facility: HOSPITAL | Age: 84
Discharge: HOME | End: 2019-07-03
Attending: FAMILY MEDICINE
Payer: MEDICARE

## 2019-07-03 ENCOUNTER — OFFICE VISIT (OUTPATIENT)
Dept: FAMILY MEDICINE CLINIC | Facility: CLINIC | Age: 84
End: 2019-07-03
Payer: MEDICARE

## 2019-07-03 VITALS
RESPIRATION RATE: 12 BRPM | TEMPERATURE: 98 F | DIASTOLIC BLOOD PRESSURE: 60 MMHG | HEART RATE: 96 BPM | WEIGHT: 158 LBS | BODY MASS INDEX: 20.94 KG/M2 | HEIGHT: 73 IN | SYSTOLIC BLOOD PRESSURE: 120 MMHG

## 2019-07-03 DIAGNOSIS — I44.7 LBBB (LEFT BUNDLE BRANCH BLOCK): ICD-10-CM

## 2019-07-03 DIAGNOSIS — E11.21 CONTROLLED TYPE 2 DIABETES MELLITUS WITH DIABETIC NEPHROPATHY, WITHOUT LONG-TERM CURRENT USE OF INSULIN (HCC): Chronic | ICD-10-CM

## 2019-07-03 DIAGNOSIS — N18.30 CKD (CHRONIC KIDNEY DISEASE) STAGE 3, GFR 30-59 ML/MIN (HCC): ICD-10-CM

## 2019-07-03 DIAGNOSIS — Z01.818 PREOPERATIVE CLEARANCE: ICD-10-CM

## 2019-07-03 DIAGNOSIS — E78.2 MIXED HYPERLIPIDEMIA: Primary | ICD-10-CM

## 2019-07-03 PROCEDURE — 99214 OFFICE O/P EST MOD 30 MIN: CPT | Performed by: FAMILY MEDICINE

## 2019-07-03 PROCEDURE — 93306 TTE W/DOPPLER COMPLETE: CPT | Performed by: FAMILY MEDICINE

## 2019-07-03 NOTE — PROGRESS NOTES
Patient presents with:  Abnormal EKG      Subjective   HPI:   This is a 80year old male coming in for abnormal EKG- new LBNB since 15 years ago, no recnt EKG. Good appetite and outside agency helping with food, weight down. BMI now 20.  Weight down > 10 lb hyperlipidemia - Primary    Overview     Simvastatin 80         LBBB (left bundle branch block)    Overview     Seen 6/2019         Relevant Orders    EKG 12-LEAD    CARD ECHO 2D DOPPLER (CPT=93306)       Endocrine    Type 2 diabetes mellitus, controlled (

## 2019-07-03 NOTE — PROGRESS NOTES
Post visit addendum: Echocardiogram done today and unchanged from 3 years ago. Specifically ejection fraction 55% and no regional motion abnormality. There appears to be no underlying atherosclerosis as a source of his left bundle branch block.   At this

## 2019-07-08 ENCOUNTER — APPOINTMENT (OUTPATIENT)
Dept: GENERAL RADIOLOGY | Facility: HOSPITAL | Age: 84
End: 2019-07-08
Attending: UROLOGY
Payer: MEDICARE

## 2019-07-08 ENCOUNTER — HOSPITAL ENCOUNTER (OUTPATIENT)
Facility: HOSPITAL | Age: 84
Discharge: HOME | End: 2019-07-10
Attending: UROLOGY | Admitting: UROLOGY
Payer: MEDICARE

## 2019-07-08 DIAGNOSIS — N40.1 BPH ASSOCIATED WITH NOCTURIA: Primary | ICD-10-CM

## 2019-07-08 DIAGNOSIS — R35.1 BPH ASSOCIATED WITH NOCTURIA: Primary | ICD-10-CM

## 2019-07-08 LAB
GLUCOSE BLD-MCNC: 144 MG/DL (ref 70–99)
GLUCOSE BLD-MCNC: 231 MG/DL (ref 70–99)
GLUCOSE BLD-MCNC: 283 MG/DL (ref 70–99)

## 2019-07-08 PROCEDURE — 0VB08ZZ EXCISION OF PROSTATE, VIA NATURAL OR ARTIFICIAL OPENING ENDOSCOPIC: ICD-10-PCS | Performed by: UROLOGY

## 2019-07-08 PROCEDURE — 99223 1ST HOSP IP/OBS HIGH 75: CPT | Performed by: HOSPITALIST

## 2019-07-08 RX ORDER — DOCUSATE SODIUM 100 MG/1
100 CAPSULE, LIQUID FILLED ORAL 2 TIMES DAILY
Status: DISCONTINUED | OUTPATIENT
Start: 2019-07-08 | End: 2019-07-10

## 2019-07-08 RX ORDER — CEFAZOLIN SODIUM/WATER 2 G/20 ML
2 SYRINGE (ML) INTRAVENOUS ONCE
Status: DISCONTINUED | OUTPATIENT
Start: 2019-07-08 | End: 2019-07-08

## 2019-07-08 RX ORDER — INSULIN ASPART 100 [IU]/ML
INJECTION, SOLUTION INTRAVENOUS; SUBCUTANEOUS ONCE
Status: DISCONTINUED | OUTPATIENT
Start: 2019-07-08 | End: 2019-07-08 | Stop reason: HOSPADM

## 2019-07-08 RX ORDER — METOCLOPRAMIDE HYDROCHLORIDE 5 MG/5ML
5 SOLUTION ORAL
Status: DISCONTINUED | OUTPATIENT
Start: 2019-07-09 | End: 2019-07-08

## 2019-07-08 RX ORDER — SODIUM CHLORIDE, SODIUM LACTATE, POTASSIUM CHLORIDE, CALCIUM CHLORIDE 600; 310; 30; 20 MG/100ML; MG/100ML; MG/100ML; MG/100ML
INJECTION, SOLUTION INTRAVENOUS CONTINUOUS
Status: DISCONTINUED | OUTPATIENT
Start: 2019-07-08 | End: 2019-07-08

## 2019-07-08 RX ORDER — INSULIN ASPART 100 [IU]/ML
INJECTION, SOLUTION INTRAVENOUS; SUBCUTANEOUS
Status: DISCONTINUED
Start: 2019-07-08 | End: 2019-07-08 | Stop reason: WASHOUT

## 2019-07-08 RX ORDER — ATROPA BELLADONNA AND OPIUM 16.2; 6 MG/1; MG/1
1 SUPPOSITORY RECTAL EVERY 6 HOURS PRN
Status: DISCONTINUED | OUTPATIENT
Start: 2019-07-08 | End: 2019-07-10

## 2019-07-08 RX ORDER — DEXTROSE MONOHYDRATE 25 G/50ML
50 INJECTION, SOLUTION INTRAVENOUS
Status: DISCONTINUED | OUTPATIENT
Start: 2019-07-08 | End: 2019-07-08 | Stop reason: HOSPADM

## 2019-07-08 RX ORDER — NALOXONE HYDROCHLORIDE 0.4 MG/ML
80 INJECTION, SOLUTION INTRAMUSCULAR; INTRAVENOUS; SUBCUTANEOUS AS NEEDED
Status: DISCONTINUED | OUTPATIENT
Start: 2019-07-08 | End: 2019-07-08 | Stop reason: HOSPADM

## 2019-07-08 RX ORDER — MORPHINE SULFATE 4 MG/ML
1 INJECTION, SOLUTION INTRAMUSCULAR; INTRAVENOUS EVERY 2 HOUR PRN
Status: DISCONTINUED | OUTPATIENT
Start: 2019-07-08 | End: 2019-07-09

## 2019-07-08 RX ORDER — ACETAMINOPHEN 500 MG
500 TABLET ORAL ONCE AS NEEDED
Status: DISCONTINUED | OUTPATIENT
Start: 2019-07-08 | End: 2019-07-08 | Stop reason: HOSPADM

## 2019-07-08 RX ORDER — ONDANSETRON 2 MG/ML
4 INJECTION INTRAMUSCULAR; INTRAVENOUS AS NEEDED
Status: DISCONTINUED | OUTPATIENT
Start: 2019-07-08 | End: 2019-07-08 | Stop reason: HOSPADM

## 2019-07-08 RX ORDER — LEVOFLOXACIN 500 MG/1
500 TABLET, FILM COATED ORAL DAILY
Status: DISCONTINUED | OUTPATIENT
Start: 2019-07-08 | End: 2019-07-08

## 2019-07-08 RX ORDER — HYDROCODONE BITARTRATE AND ACETAMINOPHEN 5; 325 MG/1; MG/1
1 TABLET ORAL EVERY 4 HOURS PRN
Status: DISCONTINUED | OUTPATIENT
Start: 2019-07-08 | End: 2019-07-09

## 2019-07-08 RX ORDER — HYDROCODONE BITARTRATE AND ACETAMINOPHEN 5; 325 MG/1; MG/1
2 TABLET ORAL EVERY 4 HOURS PRN
Status: DISCONTINUED | OUTPATIENT
Start: 2019-07-08 | End: 2019-07-09

## 2019-07-08 RX ORDER — GABAPENTIN 400 MG/1
400 CAPSULE ORAL 3 TIMES DAILY
Status: DISCONTINUED | OUTPATIENT
Start: 2019-07-08 | End: 2019-07-10

## 2019-07-08 RX ORDER — INSULIN ASPART 100 [IU]/ML
3 INJECTION, SOLUTION INTRAVENOUS; SUBCUTANEOUS ONCE
Status: COMPLETED | OUTPATIENT
Start: 2019-07-08 | End: 2019-07-08

## 2019-07-08 RX ORDER — MORPHINE SULFATE 4 MG/ML
4 INJECTION, SOLUTION INTRAMUSCULAR; INTRAVENOUS EVERY 2 HOUR PRN
Status: DISCONTINUED | OUTPATIENT
Start: 2019-07-08 | End: 2019-07-09

## 2019-07-08 RX ORDER — SODIUM CHLORIDE, SODIUM LACTATE, POTASSIUM CHLORIDE, CALCIUM CHLORIDE 600; 310; 30; 20 MG/100ML; MG/100ML; MG/100ML; MG/100ML
INJECTION, SOLUTION INTRAVENOUS CONTINUOUS
Status: DISCONTINUED | OUTPATIENT
Start: 2019-07-08 | End: 2019-07-08 | Stop reason: HOSPADM

## 2019-07-08 RX ORDER — ACETAMINOPHEN 500 MG
1000 TABLET ORAL ONCE
COMMUNITY
End: 2019-08-03 | Stop reason: ALTCHOICE

## 2019-07-08 RX ORDER — MORPHINE SULFATE 4 MG/ML
2 INJECTION, SOLUTION INTRAMUSCULAR; INTRAVENOUS EVERY 2 HOUR PRN
Status: DISCONTINUED | OUTPATIENT
Start: 2019-07-08 | End: 2019-07-09

## 2019-07-08 RX ORDER — HYDROMORPHONE HYDROCHLORIDE 1 MG/ML
INJECTION, SOLUTION INTRAMUSCULAR; INTRAVENOUS; SUBCUTANEOUS
Status: COMPLETED
Start: 2019-07-08 | End: 2019-07-08

## 2019-07-08 RX ORDER — HYDROMORPHONE HYDROCHLORIDE 1 MG/ML
0.2 INJECTION, SOLUTION INTRAMUSCULAR; INTRAVENOUS; SUBCUTANEOUS EVERY 5 MIN PRN
Status: DISCONTINUED | OUTPATIENT
Start: 2019-07-08 | End: 2019-07-08 | Stop reason: HOSPADM

## 2019-07-08 RX ORDER — LEVOFLOXACIN 5 MG/ML
500 INJECTION, SOLUTION INTRAVENOUS EVERY 24 HOURS
Status: COMPLETED | OUTPATIENT
Start: 2019-07-08 | End: 2019-07-08

## 2019-07-08 RX ORDER — OXYBUTYNIN CHLORIDE 5 MG/1
5 TABLET ORAL EVERY 6 HOURS PRN
Status: DISCONTINUED | OUTPATIENT
Start: 2019-07-08 | End: 2019-07-10

## 2019-07-08 RX ORDER — BISACODYL 10 MG
10 SUPPOSITORY, RECTAL RECTAL
Status: DISCONTINUED | OUTPATIENT
Start: 2019-07-08 | End: 2019-07-10

## 2019-07-08 RX ORDER — HYDROCODONE BITARTRATE AND ACETAMINOPHEN 5; 325 MG/1; MG/1
1 TABLET ORAL AS NEEDED
Status: DISCONTINUED | OUTPATIENT
Start: 2019-07-08 | End: 2019-07-08 | Stop reason: HOSPADM

## 2019-07-08 RX ORDER — DEXTROSE MONOHYDRATE 25 G/50ML
50 INJECTION, SOLUTION INTRAVENOUS
Status: DISCONTINUED | OUTPATIENT
Start: 2019-07-08 | End: 2019-07-10

## 2019-07-08 RX ORDER — ACETAMINOPHEN 500 MG
1000 TABLET ORAL ONCE
Status: DISCONTINUED | OUTPATIENT
Start: 2019-07-08 | End: 2019-07-08 | Stop reason: HOSPADM

## 2019-07-08 RX ORDER — POLYETHYLENE GLYCOL 3350 17 G/17G
17 POWDER, FOR SOLUTION ORAL DAILY PRN
Status: DISCONTINUED | OUTPATIENT
Start: 2019-07-08 | End: 2019-07-10

## 2019-07-08 RX ORDER — ONDANSETRON 2 MG/ML
4 INJECTION INTRAMUSCULAR; INTRAVENOUS EVERY 6 HOURS PRN
Status: DISCONTINUED | OUTPATIENT
Start: 2019-07-08 | End: 2019-07-10

## 2019-07-08 RX ORDER — SODIUM PHOSPHATE, DIBASIC AND SODIUM PHOSPHATE, MONOBASIC 7; 19 G/133ML; G/133ML
1 ENEMA RECTAL ONCE AS NEEDED
Status: DISCONTINUED | OUTPATIENT
Start: 2019-07-08 | End: 2019-07-10

## 2019-07-08 RX ORDER — METOCLOPRAMIDE HYDROCHLORIDE 5 MG/ML
5 INJECTION INTRAMUSCULAR; INTRAVENOUS EVERY 6 HOURS PRN
Status: DISCONTINUED | OUTPATIENT
Start: 2019-07-08 | End: 2019-07-10

## 2019-07-08 RX ORDER — ACETAMINOPHEN 325 MG/1
650 TABLET ORAL EVERY 4 HOURS PRN
Status: DISCONTINUED | OUTPATIENT
Start: 2019-07-08 | End: 2019-07-10

## 2019-07-08 RX ORDER — ONDANSETRON 4 MG/1
4 TABLET, ORALLY DISINTEGRATING ORAL EVERY 6 HOURS PRN
Status: DISCONTINUED | OUTPATIENT
Start: 2019-07-08 | End: 2019-07-10

## 2019-07-08 NOTE — ANESTHESIA PREPROCEDURE EVALUATION
PRE-OP EVALUATION    Patient Name: Tracy Bain    Pre-op Diagnosis: BPH associated with nocturia [N40.1, R35.1]    Procedure(s):  CYSTOSCOPY, TRANSURETHRAL RESECTION OF PROSTATE    Surgeon(s) and Role:     * Eden Urban MD - Primary    Pre-op vital Allergies: Patient has no known allergies. Anesthesia Evaluation    Patient summary reviewed. Anesthetic Complications  (-) history of anesthetic complications         GI/Hepatic/Renal    Negative GI/hepatic/renal ROS. TRANSFORAMINAL EPIDURAL - LUMBAR Right 6/12/2014    Performed by David Ziegler DO at [facility] History    Tobacco Use      Smoking status: Never Smoker      Smokeless tobacco: Never Used    Alcohol use:  Yes      Alcohol/

## 2019-07-08 NOTE — H&P
Teagan Cuellar is a 80year old male.   PCP: Isacc Ny  HPI:   Pt presents for BPH follow up                 LOV 3/12/19     Onset: ongoing worsen in the past 1 wk   C/o frequency q 1 hr w/occ urgency                  NOC x 5-6 last night daily. Disp:  Rfl:       No Known Allergies        Past Medical History:   Diagnosis Date   • CKD (chronic kidney disease) stage 3, GFR 30-59 ml/min ([facility] 2/12/2016   • Diabetes mellitus ([facility]     • Hypercholesterolemia     • Irritable bowel syndrome       No         REVIEW OF SYSTEMS:   GENERAL HEALTH: feels well otherwise  RESPIRATORY: denies shortness of breath with exertion  CARDIOVASCULAR: denies chest pain on exertion  GI: denies abdominal pain and denies heartburn  :As in HPI  NEURO: no sensory or m

## 2019-07-08 NOTE — H&P
Dr Delroy Jeffers pre op assessment appreciated. Took Bactrim last week for some dysuria. Will switch to levaquin for preop atbx, based on culture. Interviewed, examined pt, answered questions. No new history or findings. Son present.   Lungs: clear  Heart: No M

## 2019-07-08 NOTE — OPERATIVE REPORT
[address] Patient Status:  Surgery Admit - Inpt    3/19/1931 MRN [id number]   [facility] SURGERY Attending Elba Marcial MD   [facility] Day # 0 PCP Agustina Levin MD     Date of Operation; 2019    Procedure: Thelda Jamaals irrigated out of the bladder. The bladder was examined. The ureteral orifices appeared to be intact. A 22Fr 3-way Suarez catheter was placed. Traction was applied. The irrigation was returning with only the pink tinge.   The patient tolerated the procedure w

## 2019-07-08 NOTE — PROGRESS NOTES
Transport here to bring pt to his room. Pt appears like he is anxious. When asked pt states \"I don't know what I want or what I need. \" He states I just want to go to sleep.  Explained to pt that it was ok for him to rest. Pt states his pain is tolerable a

## 2019-07-08 NOTE — PLAN OF CARE
RECEIVED PT FROM PACU. PI OS AO X4. APPEARS ANXIOUS. DENIES PAIN. CDI AT MODERATE RATE WITH PINK URINE. HOSTILIST NOTIFIED TO CONSULT. WAITING FOR CALL BACK. POC UPDATED, PT VERBALIZED UNDERSTANDING.

## 2019-07-08 NOTE — ANESTHESIA POSTPROCEDURE EVALUATION
[address] Patient Status:  Surgery Admit - Inpt   Age/Gender 80year old male MRN [id number]   [facility] SURGERY Attending Yesy Gomes MD   Hosp Day # 0 PCP Keiko Merdeith MD       Anesthesia Post-op Note    Procedu

## 2019-07-09 LAB
ANION GAP SERPL CALC-SCNC: 7 MMOL/L (ref 0–18)
BASOPHILS # BLD AUTO: 0.01 X10(3) UL (ref 0–0.2)
BASOPHILS NFR BLD AUTO: 0.1 %
BUN BLD-MCNC: 40 MG/DL (ref 7–18)
BUN/CREAT SERPL: 25.5 (ref 10–20)
CALCIUM BLD-MCNC: 8.6 MG/DL (ref 8.5–10.1)
CHLORIDE SERPL-SCNC: 99 MMOL/L (ref 98–112)
CO2 SERPL-SCNC: 27 MMOL/L (ref 21–32)
CREAT BLD-MCNC: 1.57 MG/DL (ref 0.7–1.3)
DEPRECATED RDW RBC AUTO: 38.1 FL (ref 35.1–46.3)
EOSINOPHIL # BLD AUTO: 0 X10(3) UL (ref 0–0.7)
EOSINOPHIL NFR BLD AUTO: 0 %
ERYTHROCYTE [DISTWIDTH] IN BLOOD BY AUTOMATED COUNT: 11.9 % (ref 11–15)
EST. AVERAGE GLUCOSE BLD GHB EST-MCNC: 235 MG/DL (ref 68–126)
GLUCOSE BLD-MCNC: 147 MG/DL (ref 70–99)
GLUCOSE BLD-MCNC: 162 MG/DL (ref 70–99)
GLUCOSE BLD-MCNC: 164 MG/DL (ref 70–99)
GLUCOSE BLD-MCNC: 173 MG/DL (ref 70–99)
GLUCOSE BLD-MCNC: 179 MG/DL (ref 70–99)
GLUCOSE BLD-MCNC: 212 MG/DL (ref 70–99)
GLUCOSE BLD-MCNC: 293 MG/DL (ref 70–99)
HBA1C MFR BLD HPLC: 9.8 % (ref ?–5.7)
HCT VFR BLD AUTO: 29.1 % (ref 39–53)
HGB BLD-MCNC: 9.8 G/DL (ref 13–17.5)
IMM GRANULOCYTES # BLD AUTO: 0.03 X10(3) UL (ref 0–1)
IMM GRANULOCYTES NFR BLD: 0.4 %
LYMPHOCYTES # BLD AUTO: 1.02 X10(3) UL (ref 1–4)
LYMPHOCYTES NFR BLD AUTO: 11.9 %
MCH RBC QN AUTO: 30.2 PG (ref 26–34)
MCHC RBC AUTO-ENTMCNC: 33.7 G/DL (ref 31–37)
MCV RBC AUTO: 89.5 FL (ref 80–100)
MONOCYTES # BLD AUTO: 0.62 X10(3) UL (ref 0.1–1)
MONOCYTES NFR BLD AUTO: 7.2 %
NEUTROPHILS # BLD AUTO: 6.88 X10 (3) UL (ref 1.5–7.7)
NEUTROPHILS # BLD AUTO: 6.88 X10(3) UL (ref 1.5–7.7)
NEUTROPHILS NFR BLD AUTO: 80.4 %
OSMOLALITY SERPL CALC.SUM OF ELEC: 290 MOSM/KG (ref 275–295)
PLATELET # BLD AUTO: 193 10(3)UL (ref 150–450)
POTASSIUM SERPL-SCNC: 3.4 MMOL/L (ref 3.5–5.1)
POTASSIUM SERPL-SCNC: 4.2 MMOL/L (ref 3.5–5.1)
RBC # BLD AUTO: 3.25 X10(6)UL (ref 3.8–5.8)
SODIUM SERPL-SCNC: 133 MMOL/L (ref 136–145)
WBC # BLD AUTO: 8.6 X10(3) UL (ref 4–11)

## 2019-07-09 PROCEDURE — 99232 SBSQ HOSP IP/OBS MODERATE 35: CPT | Performed by: HOSPITALIST

## 2019-07-09 RX ORDER — CEFDINIR 300 MG/1
300 CAPSULE ORAL 2 TIMES DAILY
Status: DISCONTINUED | OUTPATIENT
Start: 2019-07-10 | End: 2019-07-10

## 2019-07-09 RX ORDER — LEVOFLOXACIN 250 MG/1
250 TABLET ORAL DAILY
Status: DISCONTINUED | OUTPATIENT
Start: 2019-07-09 | End: 2019-07-09

## 2019-07-09 RX ORDER — POTASSIUM CHLORIDE 20 MEQ/1
40 TABLET, EXTENDED RELEASE ORAL EVERY 4 HOURS
Status: COMPLETED | OUTPATIENT
Start: 2019-07-09 | End: 2019-07-09

## 2019-07-09 RX ORDER — QUETIAPINE 50 MG/1
50 TABLET, FILM COATED ORAL NIGHTLY
Status: DISCONTINUED | OUTPATIENT
Start: 2019-07-09 | End: 2019-07-10

## 2019-07-09 NOTE — H&P
[facility]  [address] Patient Status:  Inpatient    3/19/1931 MRN [id number]   [facility] 3NW-A Attending Supa Avendano MD   Hosp Day # 0 PCP Alee Cabrales MD     Reason for consult: medical management    Reques 6/21/2016    Performed by Marcel Askew DO at [facility] / TRANSFORAMINAL EPIDURAL STEROID INJECTION Bilateral 8/11/2015    Performed by Marcel Askew DO at [facility] General: No acute distress. Alert and oriented x 3. HEENT: Normocephalic atraumatic. Moist mucous membranes. EOM-I. PERRLA. Anicteric. Neck: No lymphadenopathy. No JVD. No carotid bruits. Respiratory: Clear to auscultation bilaterally. No wheezes.  No

## 2019-07-09 NOTE — PLAN OF CARE
Pt is A&O x1, demonstrated agitated and anxious behaviors with frequent attemps to get out of bed. VSS and afebrile. O2 sats WNL on room air, lung sounds diminished but clear. Reports pain as \"mild\" but declines pain meds as offered.  Abdomen is soft, non response  - Implement non-pharmacological measures as appropriate and evaluate response  - Consider cultural and social influences on pain and pain management  - Manage/alleviate anxiety  - Utilize distraction and/or relaxation techniques  - Monitor for op minimize noise and interruptions  - Encourage family to assist in orientation and promotion of home routines  7/9/2019 0841 by Nandini Bullard, SHI  Outcome: Progressing  7/9/2019 0832 by Nandini Bullard, RN  Outcome: Progressing     Problem: Diabetes/Glucose Con

## 2019-07-09 NOTE — PROGRESS NOTES
[facility]  Progress note     Verlinda Lose Patient Status:  Inpatient    3/19/1931 MRN [id number]   [facility] 3NW-A Attending Donte Samuels MD   Hosp Day # 0 PCP Tierra Sofia MD     Reason for consult: medical management urology    Hopefully home tomorrow if delirium clears  Quality:  · DVT Prophylaxis: SCDS  · CODE status: full  · Suarez: yes- placed post op    Kayden Cao MD  [facility]  Internal Medicine Hospitalist  Pager [phone]

## 2019-07-09 NOTE — PROGRESS NOTES
[facility]  Urology Progress Note    Sandra Collins Patient Status:  Outpatient in a Bed    3/19/1931 MRN [id number]   [facility] 3NW-A Attending Lizette Gamble MD   Hosp Day # 0 PCP Jj Nagy MD     Subjective:  [address]

## 2019-07-09 NOTE — PROGRESS NOTES
[facility] Pharmacy Note:  Renal Dose Adjustment for Metoclopramide (REGLAN)    Shyann Dove has been prescribed Metoclopramide (REGLAN) 10 mg every 6 hours as needed for nausea/vomiting.     Estimated CrCl 29.6 ml/min based on 6/19/19 SCr of 1.77 mg/dL    Hi

## 2019-07-09 NOTE — PROGRESS NOTES
0743 - during initial rounds, patient only oriented to self. Unable to tell this RN place, situation or year. Patient remains calm and cooperative. Follows commands. Bed alarm on. Daughter at bedside. Door to remain open.  Brenda MICHELLE on unit, asked this RN to

## 2019-07-09 NOTE — PROGRESS NOTES
1008 - call received from Dr. Jose J Mendoza, states that [address] can cause altered mental status in the elderly. Asking if urology is ok changing abx. 1010 - message left for [facility] regarding abx.      1014 - call received back from [facility], will discuss

## 2019-07-09 NOTE — PLAN OF CARE
Patient alert, oriented to self. States pelvis is sore. Denies need for pain medication. Room air, pulse oximeter to remain on. Tolerated clear liquids. Denies nausea. Advanced diet for lunch. BM today. CBI clamped at 0900.  Urine remains clot free, pink to unsuccessful or patient reports new pain  - Anticipate increased pain with activity and pre-medicate as appropriate  7/9/2019 1138 by Virgil Palacios RN  Outcome: Progressing  7/9/2019 1048 by Virgil Palacios RN  Outcome: Progressing     Problem: RIS Glucose maintained within prescribed range  Description  INTERVENTIONS:  - Monitor Blood Glucose as ordered  - Assess for signs and symptoms of hyperglycemia and hypoglycemia  - Administer ordered medications to maintain glucose within target range  - Asse

## 2019-07-10 ENCOUNTER — PATIENT OUTREACH (OUTPATIENT)
Dept: CASE MANAGEMENT | Age: 84
End: 2019-07-10

## 2019-07-10 VITALS
WEIGHT: 160 LBS | BODY MASS INDEX: 21.2 KG/M2 | TEMPERATURE: 99 F | RESPIRATION RATE: 18 BRPM | DIASTOLIC BLOOD PRESSURE: 64 MMHG | SYSTOLIC BLOOD PRESSURE: 117 MMHG | HEART RATE: 91 BPM | HEIGHT: 73 IN | OXYGEN SATURATION: 99 %

## 2019-07-10 DIAGNOSIS — E11.21 TYPE 2 DIABETES MELLITUS WITH DIABETIC NEPHROPATHY, WITHOUT LONG-TERM CURRENT USE OF INSULIN (HCC): Chronic | ICD-10-CM

## 2019-07-10 DIAGNOSIS — M48.061 SPINAL STENOSIS, LUMBAR REGION, WITHOUT NEUROGENIC CLAUDICATION: Chronic | ICD-10-CM

## 2019-07-10 DIAGNOSIS — N18.30 CKD (CHRONIC KIDNEY DISEASE) STAGE 3, GFR 30-59 ML/MIN (HCC): ICD-10-CM

## 2019-07-10 DIAGNOSIS — E78.2 MIXED HYPERLIPIDEMIA: ICD-10-CM

## 2019-07-10 LAB
GLUCOSE BLD-MCNC: 154 MG/DL (ref 70–99)
GLUCOSE BLD-MCNC: 224 MG/DL (ref 70–99)

## 2019-07-10 PROCEDURE — 99239 HOSP IP/OBS DSCHRG MGMT >30: CPT | Performed by: HOSPITALIST

## 2019-07-10 RX ORDER — CEFDINIR 300 MG/1
300 CAPSULE ORAL 2 TIMES DAILY
Qty: 6 CAPSULE | Refills: 0 | Status: SHIPPED | OUTPATIENT
Start: 2019-07-10 | End: 2019-07-13

## 2019-07-10 NOTE — PROGRESS NOTES
[facility]  Urology Progress Note    Jhoan Campbell Patient Status:  Outpatient in a Bed    3/19/1931 MRN [id number]   [facility] 3NW-A Attending Emerita Carr MD   Hosp Day # 0 PCP Kobi Milligan MD     Assessment:  POD#2 TURP.  Rec

## 2019-07-10 NOTE — PLAN OF CARE
Pt is A&O x4. VSS and afebrile. O2 sats WNL on room air, lung sounds diminished but clear bilaterally. Denies pain. Abdomen is soft, nondistended and nontender. Bowel sounds active x4. Passing flatus. Last BM 7/9.  Suarez catheter in place draining yellow, r

## 2019-07-10 NOTE — OCCUPATIONAL THERAPY NOTE
OCCUPATIONAL THERAPY QUICK EVALUATION - INPATIENT    Room Number: 317/317-A  Evaluation Date: 7/10/2019     Type of Evaluation: Initial and Quick Eval  Presenting Problem: TURP, post op AMS    Physician Order: IP Consult to Occupational Therapy  Reason for [employer]   • LUMBAR / TRANSFORAMINAL EPIDURAL STEROID INJECTION N/A 6/21/2016    Performed by Leroy Van DO at [place] [address] / TRANSFORAMINAL EPIDURAL STEROID INJECTION Bilateral 8/11/2015    Performed by Leroy Van DO at [facility] lower body clothing?: A Little   -   Bathing (including washing, rinsing, drying)?: A Little  -   Toileting, which includes using toilet, bedpan or urinal? : A Little  -   Putting on and taking off regular upper body clothing?: A Little  -   Taking care of scored 22 on this screening test.   In the original validation sample for the SBT Algkelly Seals et al., 1983), 90% of normal scores 6 points or less.  Scores of 7 or higher would indicate a need for further evaluation to rule out a dementing disorder, such as Al

## 2019-07-10 NOTE — PROGRESS NOTES
Patient seen and examined. Medically cleared for discharge, if all involved specialists' consent for discharge.     Gustvao Bone MD  [facility]  Internal Medicine Hospitalist  Cell [phone]

## 2019-07-10 NOTE — PROGRESS NOTES
Antonio De La Fuente returned call with an update states patient is being discharged he seems to be more oriented and will be home tonight. Son and daughter plan to stay in the home for the next few days to make sure everything is fine.     States she will call back dick

## 2019-07-10 NOTE — PROGRESS NOTES
Spoke to patients caregiver Millie Crooks states patient is currently in the hospital for Cysto with Clare Abbott as patient was experiencing urinary retention. Millie Crooks states patient will be discharged later this afternoon.     Millie Crooks states she has noticed patient drinks

## 2019-07-10 NOTE — PROGRESS NOTES
Patient seen in follow-up. Suarez catheter removed this AM.  Patient has voided once, but amount not measured. Also had a BM. Abdomen soft, non-tender, non-distended.       Plan:  Check PVR bladder scan  Rx: Omnicef x 3 days on DC    Anticipate discha

## 2019-07-10 NOTE — PLAN OF CARE
Iv removed, telemetry removed, discharge instructions given with verbal understanding. Patient to be transported by wheelchair by transport.    Reviewed safety precautions with son regarding dad being discharged in regards to having more confusion while in activity and pre-medicate as appropriate  7/10/2019 1510 by Martin May RN  Outcome: Adequate for Discharge  7/10/2019 0942 by Martin May RN  Outcome: Progressing     Problem: RISK FOR INFECTION - ADULT  Goal: Absence of fever/infection during ant range  Description  INTERVENTIONS:  - Monitor Blood Glucose as ordered  - Assess for signs and symptoms of hyperglycemia and hypoglycemia  - Administer ordered medications to maintain glucose within target range  - Assess barriers to adequate nutritional i

## 2019-07-10 NOTE — PLAN OF CARE
A/o x2 daughter states that this is close to his baseline orientation. She feels safe taking him home as long as he is stable from PT stand point. Denies Chest pain, sob, Lightheadedness, dizziness.    Up and and got in the chair with tech this am without Manage/alleviate anxiety  - Utilize distraction and/or relaxation techniques  - Monitor for opioid side effects  - Notify MD/LIP if interventions unsuccessful or patient reports new pain  - Anticipate increased pain with activity and pre-medicate as approp barriers to adequate nutritional intake and initiate nutrition consult as needed  - Instruct patient on self management of diabetes  Outcome: Progressing

## 2019-07-10 NOTE — CM/SW NOTE
07/10/19 1500   CM/SW Referral Data   Referral Source Physician   Reason for Referral Discharge planning   Informant Patient; Children;Edward Staff   Patient Info   Patient's Mental Status Alert;Memory Impairments   Patient's Home Environment [facility]

## 2019-07-10 NOTE — PHYSICAL THERAPY NOTE
PHYSICAL THERAPY QUICK EVALUATION - INPATIENT    Room Number: 317/317-A  Evaluation Date: 7/10/2019  Presenting Problem: s/p TURP 7/8/19  Physician Order: PT Eval and Treat    Problem List  Active Problems:    Diabetes mellitus ([facility]    Benign prostatic [employer]   • OTHER SURGICAL HISTORY  1995    chest cyst (thymus(   • TONSILLECTOMY  1937   • TRANSFORAMINAL EPIDURAL - LUMBAR Bilateral 2/26/2015    Performed by Katie Malik DO at [address]   • TRANSFORAMINAL EPIDURAL - LUMBAR Right 6 chair (including a wheelchair)?: A Little   -   Need to walk in hospital room?: A Little   -   Climbing 3-5 steps with a railing?: A Little       AM-PAC Score:  Raw Score: 18   Approx Degree of Impairment: 46.58%   Standardized Score (AM-PAC Scale): 43.63 this time. Patient discharged from Physical Therapy services. Please re-order if a new functional limitation presents during this admission. GOALS  Patient was able to achieve the following goals . ..     Patient was able to transfer Safely with supervi

## 2019-07-11 NOTE — DISCHARGE SUMMARY
[facility]  DISCHARGE SUMMARY     Teagan Cuellar Patient Status:  Outpatient in a Bed    3/19/1931 MRN [id number]   [facility] 3NW-A Attending No att. providers found   [facility] Day # 0 PCP Catarina Mas MD     Date of Admission:  0     CALCIUM + D 600-200 MG-UNIT Tabs  Generic drug:  Calcium Carbonate-Vitamin D      Take 1 tablet by mouth daily. Refills:  0     furosemide 40 MG Tabs  Commonly known as:  LASIX      Take 1 tablet (40 mg total) by mouth 2 (two) times daily.    Pat Lota Abdomen: Soft, nontender, nondistended. No rebound, guarding or organomegaly. Musculoskeletal: Moves all extremities.   Extremities: No edema or cyanosis.  -----------------------------------------------------------------------------------------------

## 2019-07-22 ENCOUNTER — TELEPHONE (OUTPATIENT)
Dept: FAMILY MEDICINE CLINIC | Facility: CLINIC | Age: 84
End: 2019-07-22

## 2019-07-22 NOTE — TELEPHONE ENCOUNTER
Patient had procedure done 7/8 by Dr. Trina Hamman (Renetta Cook)   Patient wants to know if Dr. Jose J Rodriguez wants him to come in for a follow up for this

## 2019-07-31 PROCEDURE — 99490 CHRNC CARE MGMT STAFF 1ST 20: CPT

## 2019-08-03 ENCOUNTER — HOSPITAL ENCOUNTER (OUTPATIENT)
Age: 84
Discharge: HOME | End: 2019-08-03
Attending: FAMILY MEDICINE
Payer: MEDICARE

## 2019-08-03 VITALS
DIASTOLIC BLOOD PRESSURE: 61 MMHG | HEIGHT: 72 IN | BODY MASS INDEX: 21.67 KG/M2 | RESPIRATION RATE: 20 BRPM | HEART RATE: 90 BPM | OXYGEN SATURATION: 97 % | SYSTOLIC BLOOD PRESSURE: 132 MMHG | TEMPERATURE: 98 F | WEIGHT: 160 LBS

## 2019-08-03 DIAGNOSIS — L73.9 FOLLICULITIS: Primary | ICD-10-CM

## 2019-08-03 PROCEDURE — 99213 OFFICE O/P EST LOW 20 MIN: CPT

## 2019-08-03 PROCEDURE — 99204 OFFICE O/P NEW MOD 45 MIN: CPT

## 2019-08-03 RX ORDER — CEPHALEXIN 500 MG/1
500 CAPSULE ORAL 3 TIMES DAILY
Qty: 21 CAPSULE | Refills: 0 | Status: SHIPPED | OUTPATIENT
Start: 2019-08-03 | End: 2019-08-09 | Stop reason: ALTCHOICE

## 2019-08-03 NOTE — ED PROVIDER NOTES
Patient Seen in: [facility]    History   Patient presents with:  Rash Skin Problem (integumentary)    Stated Complaint: Rash    HPI    42-year-old male with history of diabetes, chronic kidney disease, hyperlipidemia, and hypert STEROID INJECTION N/A 6/21/2016    Performed by Simon Manjarrez DO at [facility] / TRANSFORAMINAL EPIDURAL STEROID INJECTION Bilateral 8/11/2015    Performed by Simon Manjarrez DO at [address] [place] [address] are open, but dry crusting. Scattered pustules on the bilateral cheeks, left side of chin, and a small circular erythematous macules at the right anterior neck.     ED Course   Labs Reviewed - No data to display           MDM   80year-old male with small

## 2019-08-03 NOTE — ED INITIAL ASSESSMENT (HPI)
Woke up yesterday morning with rashes on forehead. Denies aches, pain or itching. Had placed Hydrocortisone cream yesterday with no relief.

## 2019-08-05 ENCOUNTER — TELEPHONE (OUTPATIENT)
Dept: FAMILY MEDICINE CLINIC | Facility: CLINIC | Age: 84
End: 2019-08-05

## 2019-08-05 NOTE — TELEPHONE ENCOUNTER
Called and talked to patient explained that the medication will take several more days until he notices a difference but he also c/o weight loss over last 2 months without trying denies appetite problems made appointmetn for him to see Dr Jessie Edmonds

## 2019-08-05 NOTE — TELEPHONE ENCOUNTER
Patient called  went to walk in clinic on 08/03 for his rash, states cream not helping, still has rash on forehead and chin, wants to know what can do?

## 2019-08-08 ENCOUNTER — PATIENT OUTREACH (OUTPATIENT)
Dept: CASE MANAGEMENT | Age: 84
End: 2019-08-08

## 2019-08-08 DIAGNOSIS — E78.2 MIXED HYPERLIPIDEMIA: ICD-10-CM

## 2019-08-08 DIAGNOSIS — N18.30 CKD (CHRONIC KIDNEY DISEASE) STAGE 3, GFR 30-59 ML/MIN (HCC): ICD-10-CM

## 2019-08-08 DIAGNOSIS — E11.21 TYPE 2 DIABETES MELLITUS WITH DIABETIC NEPHROPATHY, WITHOUT LONG-TERM CURRENT USE OF INSULIN (HCC): Chronic | ICD-10-CM

## 2019-08-08 NOTE — PROGRESS NOTES
8/8/2019  Spoke to Wadley for CCM.       Updates to patient care team/ comments: None  Patient reported changes in medications: None  Med Adherence  Comment: Taking as directed     Health Maintenance: Reviewed  Annual Physical due on 07/13/2019  Influenza V in achieving goal, 5= very confident               - confidence: : 5      Care Manager Follow Up: 1 month  Reason For Follow Up: review progress and or barriers towards patients goals.      Care managers interventions: Advised pt toast, potatoes, and jelly

## 2019-08-10 ENCOUNTER — OFFICE VISIT (OUTPATIENT)
Dept: FAMILY MEDICINE CLINIC | Facility: CLINIC | Age: 84
End: 2019-08-10
Payer: MEDICARE

## 2019-08-10 VITALS
TEMPERATURE: 97 F | HEART RATE: 72 BPM | BODY MASS INDEX: 20.59 KG/M2 | WEIGHT: 152 LBS | RESPIRATION RATE: 12 BRPM | DIASTOLIC BLOOD PRESSURE: 60 MMHG | HEIGHT: 72 IN | SYSTOLIC BLOOD PRESSURE: 100 MMHG

## 2019-08-10 DIAGNOSIS — R63.4 UNEXPLAINED WEIGHT LOSS: ICD-10-CM

## 2019-08-10 DIAGNOSIS — L71.9 ROSACEA: ICD-10-CM

## 2019-08-10 DIAGNOSIS — N18.30 CKD (CHRONIC KIDNEY DISEASE) STAGE 3, GFR 30-59 ML/MIN (HCC): ICD-10-CM

## 2019-08-10 DIAGNOSIS — E11.21 TYPE 2 DIABETES MELLITUS WITH DIABETIC NEPHROPATHY, WITHOUT LONG-TERM CURRENT USE OF INSULIN (HCC): Primary | Chronic | ICD-10-CM

## 2019-08-10 LAB
CARTRIDGE LOT#: 540 NUMERIC
HEMOGLOBIN A1C: 8.6 % (ref 4.3–5.6)

## 2019-08-10 PROCEDURE — 83036 HEMOGLOBIN GLYCOSYLATED A1C: CPT | Performed by: FAMILY MEDICINE

## 2019-08-10 PROCEDURE — 99214 OFFICE O/P EST MOD 30 MIN: CPT | Performed by: FAMILY MEDICINE

## 2019-08-10 NOTE — PROGRESS NOTES
Patient presents with:  Rash: on forehead   Weight Loss      Subjective   HPI:   This is a 80year old male coming in for loosing weight, worse sugrs, stable memory,    Recent redness on face, no change with cephalexin.      HPI   See reviewed tab for PMSFH Encounters:  08/10/19 : 152 lb  08/09/19 : 155 lb  08/03/19 : 160 lb  07/08/19 : 160 lb  07/03/19 : 158 lb  06/29/19 : 157 lb 9.6 oz  :      Assessment and Plan:     Problem List Items Addressed This Visit        Endocrine    Diabetes mellitus ([facility] - Prim

## 2019-08-10 NOTE — PATIENT INSTRUCTIONS
Glucerna shake, or Boost powder  Use twice a day for near future. Labs in 1 month. metrogel for rash on face.

## 2019-08-12 ENCOUNTER — TELEPHONE (OUTPATIENT)
Dept: FAMILY MEDICINE CLINIC | Facility: CLINIC | Age: 84
End: 2019-08-12

## 2019-08-12 RX ORDER — CLINDAMYCIN PHOSPHATE 10 MG/G
1 GEL TOPICAL 2 TIMES DAILY
Qty: 30 G | Refills: 2 | Status: SHIPPED | OUTPATIENT
Start: 2019-08-12 | End: 2019-08-12

## 2019-08-12 RX ORDER — CLINDAMYCIN PHOSPHATE 11.9 MG/ML
1 SOLUTION TOPICAL 2 TIMES DAILY
Qty: 60 ML | Refills: 3 | Status: SHIPPED | OUTPATIENT
Start: 2019-08-12 | End: 2019-12-11 | Stop reason: ALTCHOICE

## 2019-08-12 NOTE — TELEPHONE ENCOUNTER
Pt was seen on Saturday for pimples on forehead by Dr Manpreet Wood. Med prescribed is too expensive therefore pt is requesting another med.  Please call him back with status

## 2019-08-12 NOTE — TELEPHONE ENCOUNTER
Left message on answering machine that an alternative medication has been sent to [facility]

## 2019-08-12 NOTE — TELEPHONE ENCOUNTER
Metronidazole and Clindamycin are not covered by insurance. Is there something else that pt can use? Routed to Dr Jessi Ortiz.

## 2019-08-12 NOTE — TELEPHONE ENCOUNTER
Please notify:    Requested Prescriptions     Signed Prescriptions Disp Refills   • Clindamycin Phosphate 1 % External Solution 60 mL 3     Sig: Apply 1 Application topically 2 (two) times daily.      Authorizing Provider: Malathi Pearson like this

## 2019-08-12 NOTE — TELEPHONE ENCOUNTER
LOV 8/10/19. Pt states that Metronidazole cream is too expensive. He requests a cheaper alternative. Routed to Dr Kervin Mendoza.

## 2019-08-12 NOTE — TELEPHONE ENCOUNTER
Patient called states both the metRONIDAZOLE 0.75 % External Cream and Clindamycin Phosphate 1 % External Gel are not covered through insurance, wants to know if can send a different script or if these medications are over the counter that's why its not co

## 2019-08-12 NOTE — TELEPHONE ENCOUNTER
Please notify:    Requested Prescriptions     Signed Prescriptions Disp Refills   • Clindamycin Phosphate 1 % External Gel 30 g 2     Sig: Apply 1 Application topically 2 (two) times daily.      Authorizing Provider: Catia Hernandez to:      Orlin Jones

## 2019-08-19 ENCOUNTER — TELEPHONE (OUTPATIENT)
Dept: FAMILY MEDICINE CLINIC | Facility: CLINIC | Age: 84
End: 2019-08-19

## 2019-08-19 NOTE — TELEPHONE ENCOUNTER
Patient is calling he wants to know if he can go back on his aspirin 81 mg daily and his fish oil.  Please call

## 2019-08-19 NOTE — TELEPHONE ENCOUNTER
Pt states he was advised to stop ASA 81mg and fish oil and wants to know if he should go back meds. I asked Pt if he was to hold meds for procedure that he had on 78/19 and he states that Dr. Eugene George told him that after the procedure.   Pt just not sure  Plea

## 2019-08-31 PROCEDURE — 99490 CHRNC CARE MGMT STAFF 1ST 20: CPT

## 2019-09-05 ENCOUNTER — TELEPHONE (OUTPATIENT)
Dept: FAMILY MEDICINE CLINIC | Facility: CLINIC | Age: 84
End: 2019-09-05

## 2019-09-09 ENCOUNTER — LAB ENCOUNTER (OUTPATIENT)
Dept: LAB | Facility: HOSPITAL | Age: 84
End: 2019-09-09
Attending: FAMILY MEDICINE
Payer: MEDICARE

## 2019-09-09 DIAGNOSIS — N18.30 CKD (CHRONIC KIDNEY DISEASE) STAGE 3, GFR 30-59 ML/MIN (HCC): ICD-10-CM

## 2019-09-09 DIAGNOSIS — E11.21 TYPE 2 DIABETES MELLITUS WITH DIABETIC NEPHROPATHY, WITHOUT LONG-TERM CURRENT USE OF INSULIN (HCC): ICD-10-CM

## 2019-09-09 LAB
ALBUMIN SERPL-MCNC: 3.2 G/DL (ref 3.4–5)
ALBUMIN/GLOB SERPL: 0.8 {RATIO} (ref 1–2)
ALP LIVER SERPL-CCNC: 85 U/L (ref 45–117)
ALT SERPL-CCNC: 33 U/L (ref 16–61)
ANION GAP SERPL CALC-SCNC: 6 MMOL/L (ref 0–18)
AST SERPL-CCNC: 21 U/L (ref 15–37)
BASOPHILS # BLD AUTO: 0.05 X10(3) UL (ref 0–0.2)
BASOPHILS NFR BLD AUTO: 0.7 %
BILIRUB SERPL-MCNC: 0.5 MG/DL (ref 0.1–2)
BUN BLD-MCNC: 38 MG/DL (ref 7–18)
BUN/CREAT SERPL: 23 (ref 10–20)
CALCIUM BLD-MCNC: 9.2 MG/DL (ref 8.5–10.1)
CHLORIDE SERPL-SCNC: 103 MMOL/L (ref 98–112)
CO2 SERPL-SCNC: 32 MMOL/L (ref 21–32)
CREAT BLD-MCNC: 1.65 MG/DL (ref 0.7–1.3)
DEPRECATED RDW RBC AUTO: 45.4 FL (ref 35.1–46.3)
EOSINOPHIL # BLD AUTO: 0.26 X10(3) UL (ref 0–0.7)
EOSINOPHIL NFR BLD AUTO: 3.4 %
ERYTHROCYTE [DISTWIDTH] IN BLOOD BY AUTOMATED COUNT: 13.4 % (ref 11–15)
EST. AVERAGE GLUCOSE BLD GHB EST-MCNC: 197 MG/DL (ref 68–126)
GLOBULIN PLAS-MCNC: 4.2 G/DL (ref 2.8–4.4)
GLUCOSE BLD-MCNC: 255 MG/DL (ref 70–99)
HBA1C MFR BLD HPLC: 8.5 % (ref ?–5.7)
HCT VFR BLD AUTO: 34.4 % (ref 39–53)
HGB BLD-MCNC: 11.2 G/DL (ref 13–17.5)
IMM GRANULOCYTES # BLD AUTO: 0.02 X10(3) UL (ref 0–1)
IMM GRANULOCYTES NFR BLD: 0.3 %
LYMPHOCYTES # BLD AUTO: 2.03 X10(3) UL (ref 1–4)
LYMPHOCYTES NFR BLD AUTO: 26.6 %
M PROTEIN MFR SERPL ELPH: 7.4 G/DL (ref 6.4–8.2)
MCH RBC QN AUTO: 30.4 PG (ref 26–34)
MCHC RBC AUTO-ENTMCNC: 32.6 G/DL (ref 31–37)
MCV RBC AUTO: 93.2 FL (ref 80–100)
MONOCYTES # BLD AUTO: 0.48 X10(3) UL (ref 0.1–1)
MONOCYTES NFR BLD AUTO: 6.3 %
NEUTROPHILS # BLD AUTO: 4.78 X10 (3) UL (ref 1.5–7.7)
NEUTROPHILS # BLD AUTO: 4.78 X10(3) UL (ref 1.5–7.7)
NEUTROPHILS NFR BLD AUTO: 62.7 %
OSMOLALITY SERPL CALC.SUM OF ELEC: 310 MOSM/KG (ref 275–295)
PLATELET # BLD AUTO: 230 10(3)UL (ref 150–450)
POTASSIUM SERPL-SCNC: 3.9 MMOL/L (ref 3.5–5.1)
RBC # BLD AUTO: 3.69 X10(6)UL (ref 3.8–5.8)
SODIUM SERPL-SCNC: 141 MMOL/L (ref 136–145)
WBC # BLD AUTO: 7.6 X10(3) UL (ref 4–11)

## 2019-09-09 PROCEDURE — 80053 COMPREHEN METABOLIC PANEL: CPT

## 2019-09-09 PROCEDURE — 85025 COMPLETE CBC W/AUTO DIFF WBC: CPT

## 2019-09-09 PROCEDURE — 83036 HEMOGLOBIN GLYCOSYLATED A1C: CPT

## 2019-09-09 PROCEDURE — 36415 COLL VENOUS BLD VENIPUNCTURE: CPT

## 2019-09-10 PROBLEM — B35.1 DERMATOPHYTOSIS, NAIL: Status: RESOLVED | Noted: 2018-04-23 | Resolved: 2019-09-10

## 2019-09-11 ENCOUNTER — OFFICE VISIT (OUTPATIENT)
Dept: FAMILY MEDICINE CLINIC | Facility: CLINIC | Age: 84
End: 2019-09-11
Payer: MEDICARE

## 2019-09-11 VITALS
TEMPERATURE: 98 F | DIASTOLIC BLOOD PRESSURE: 60 MMHG | WEIGHT: 156 LBS | BODY MASS INDEX: 21.13 KG/M2 | RESPIRATION RATE: 14 BRPM | SYSTOLIC BLOOD PRESSURE: 120 MMHG | HEART RATE: 72 BPM | HEIGHT: 72 IN

## 2019-09-11 DIAGNOSIS — E78.2 MIXED HYPERLIPIDEMIA: ICD-10-CM

## 2019-09-11 DIAGNOSIS — Z00.00 ANNUAL PHYSICAL EXAM: Primary | ICD-10-CM

## 2019-09-11 DIAGNOSIS — Z00.00 ENCOUNTER FOR ANNUAL HEALTH EXAMINATION: ICD-10-CM

## 2019-09-11 DIAGNOSIS — Z23 NEED FOR VACCINATION: ICD-10-CM

## 2019-09-11 DIAGNOSIS — IMO0001 MILD AORTIC SCLEROSIS: ICD-10-CM

## 2019-09-11 DIAGNOSIS — M25.561 CHRONIC PAIN OF BOTH KNEES: ICD-10-CM

## 2019-09-11 DIAGNOSIS — N18.30 CKD (CHRONIC KIDNEY DISEASE) STAGE 3, GFR 30-59 ML/MIN (HCC): ICD-10-CM

## 2019-09-11 DIAGNOSIS — G89.29 CHRONIC PAIN OF BOTH KNEES: ICD-10-CM

## 2019-09-11 DIAGNOSIS — E11.65 TYPE 2 DIABETES MELLITUS WITH HYPERGLYCEMIA, WITHOUT LONG-TERM CURRENT USE OF INSULIN (HCC): ICD-10-CM

## 2019-09-11 DIAGNOSIS — M25.562 CHRONIC PAIN OF BOTH KNEES: ICD-10-CM

## 2019-09-11 PROCEDURE — G0008 ADMIN INFLUENZA VIRUS VAC: HCPCS | Performed by: FAMILY MEDICINE

## 2019-09-11 PROCEDURE — 90662 IIV NO PRSV INCREASED AG IM: CPT | Performed by: FAMILY MEDICINE

## 2019-09-11 PROCEDURE — G0439 PPPS, SUBSEQ VISIT: HCPCS | Performed by: FAMILY MEDICINE

## 2019-09-11 PROCEDURE — 99213 OFFICE O/P EST LOW 20 MIN: CPT | Performed by: FAMILY MEDICINE

## 2019-09-11 RX ORDER — PIOGLITAZONEHYDROCHLORIDE 15 MG/1
15 TABLET ORAL DAILY
Qty: 90 TABLET | Refills: 1 | Status: SHIPPED | OUTPATIENT
Start: 2019-09-11 | End: 2020-06-25

## 2019-09-11 NOTE — PATIENT INSTRUCTIONS
Ziyad Bueno's SCREENING SCHEDULE   Tests on this list are recommended by your physician but may not be covered, or covered at this frequency, by your insurer. Please check with your insurance carrier before scheduling to verify coverage.     PREVENT Covered up to Age 76     Colonoscopy Screen   Covered every 10 years- more often if abnormal There are no preventive care reminders to display for this patient.  Update Health Maintenance if applicable    Flex Sigmoidoscopy Screen  Covered every 5 years No Toxoid- Only covered with a cut with metal- TD and TDaP Not covered by Medicare Part B) No orders found for this or any previous visit.  This may be covered with your prescription benefits, but Medicare does not cover unless Medically needed    Zoster (Not

## 2019-09-11 NOTE — PROGRESS NOTES
HPI:   Celina Hernadez is a 80year old male who presents for a Medicare Subsequent Annual Wellness visit (Pt already had Initial Annual Wellness).     Worsening memory, posugar mcbrideina   His last annual assessment has been over 1 year: Annual Physical du diabetes mellitus (HCC)     Venous insufficiency of both lower extremities     CKD (chronic kidney disease) stage 3, GFR 30-59 ml/min (HCC)     Spondylolisthesis, grade 1     Mild aortic sclerosis (HCC)     Combined forms of age-related cataract, bilateral daily.        MEDICAL INFORMATION:   He  has a past medical history of Back problem, BPH (benign prostatic hyperplasia), CKD (chronic kidney disease) stage 3, GFR 30-59 ml/min ([facility] [address] (2/12/2016), Diabetes ([facility] [address] Diabetes mellitus ([facility] [address] High blood pressure, patient documented not to have experienced any of the following events (Bilateral, 8/11/2015); injection, w/wo contrast, dx/therapeutic substance, epidural/subarachnoid; lumbar/sacral (N/A, 6/21/2016); patient withough preoperative order for iv antibiotic Physical Exam   Nursing note and vitals reviewed. Constitutional: He is oriented to person, place, and time. Vital signs are normal. He appears well-developed and well-nourished. HENT:   Head: Normocephalic and atraumatic.    Right Ear: Tympanic mem History     Immunization History   Administered Date(s) Administered   • FLU VACC High Dose 65 YRS & Older PRSV Free (89156) 09/21/2015, 10/12/2016, 09/01/2017, 09/26/2018, 09/11/2019   • Fluvirin, 3 Years & >, Im 11/14/2014   • Influenza 10/22/1997, 10/01 HCl 850 MG Oral Tab          Add Actos 15 and reassess in 2-3 months         Relevant Medications    Pioglitazone HCl 15 MG Oral Tab    Other Relevant Orders    OFFICE/OUTPT VISIT,EST,LEVL III       Genitourinary    CKD (chronic kidney disease) stage 3, GF (mg/dL)   Date Value   01/13/2016 70        EKG - w/ Initial Preventative Physical Exam only, or if medically necessary Electrocardiogram date06/19/2019    Colorectal Cancer Screening      Colonoscopy Screen every 10 years There are no preventive care pam External Lab or Procedure      Annual Monitoring of Persistent     Medications (ACE/ARB, digoxin diuretics, anticonvulsants.)    Potassium  Annually Potassium (mmol/L)   Date Value   09/09/2019 3.9    No flowsheet data found.     Creatinine  Annually CREATI

## 2019-09-12 NOTE — ASSESSMENT & PLAN NOTE
As for his Diabetes, it is borderline controlled. Recommendations are: continue present meds, lose weight by increased dietary compliance and exercise and will check labs as ordered.     Lab Results   Component Value Date    A1C 8.5 (H) 09/09/2019    A1

## 2019-09-12 NOTE — ASSESSMENT & PLAN NOTE
Stable, Continue present management.     Cholesterol Lowering Medications          simvastatin 80 MG Oral Tab

## 2019-09-13 ENCOUNTER — HOSPITAL ENCOUNTER (OUTPATIENT)
Dept: GENERAL RADIOLOGY | Age: 84
Discharge: HOME | End: 2019-09-13
Attending: FAMILY MEDICINE
Payer: MEDICARE

## 2019-09-13 DIAGNOSIS — M25.562 CHRONIC PAIN OF BOTH KNEES: ICD-10-CM

## 2019-09-13 DIAGNOSIS — M25.561 CHRONIC PAIN OF BOTH KNEES: ICD-10-CM

## 2019-09-13 DIAGNOSIS — G89.29 CHRONIC PAIN OF BOTH KNEES: ICD-10-CM

## 2019-09-13 PROCEDURE — 73560 X-RAY EXAM OF KNEE 1 OR 2: CPT | Performed by: FAMILY MEDICINE

## 2019-09-24 ENCOUNTER — PATIENT OUTREACH (OUTPATIENT)
Dept: CASE MANAGEMENT | Age: 84
End: 2019-09-24

## 2019-09-24 DIAGNOSIS — E78.2 MIXED HYPERLIPIDEMIA: ICD-10-CM

## 2019-09-24 DIAGNOSIS — E11.65 TYPE 2 DIABETES MELLITUS WITH HYPERGLYCEMIA, WITHOUT LONG-TERM CURRENT USE OF INSULIN (HCC): ICD-10-CM

## 2019-09-24 NOTE — PROGRESS NOTES
9/24/2019  Spoke to Gurpreet Morris for CCM. Updates to patient care team/ comments: None  Patient reported changes in medications: None  Med Adherence  Comment: Taking medications as directed.     Health Maintenance: Preventative care current   Diabetes Care A goal, 5= very confident               - confidence: : 5     Care Manager Follow Up: One month    Reason For Follow Up: review progress and or barriers towards patients goals.      Care managers interventions: Encouraged compliance with at home PT exercises

## 2019-09-30 PROCEDURE — 99490 CHRNC CARE MGMT STAFF 1ST 20: CPT

## 2019-10-09 ENCOUNTER — OFFICE VISIT (OUTPATIENT)
Dept: FAMILY MEDICINE CLINIC | Facility: CLINIC | Age: 84
End: 2019-10-09
Payer: MEDICARE

## 2019-10-09 VITALS
TEMPERATURE: 97 F | RESPIRATION RATE: 12 BRPM | DIASTOLIC BLOOD PRESSURE: 60 MMHG | HEIGHT: 72.5 IN | SYSTOLIC BLOOD PRESSURE: 130 MMHG | WEIGHT: 157 LBS | HEART RATE: 72 BPM | BODY MASS INDEX: 21.03 KG/M2

## 2019-10-09 DIAGNOSIS — M17.11 ARTHRITIS OF RIGHT KNEE: ICD-10-CM

## 2019-10-09 DIAGNOSIS — N63.20 LEFT BREAST LUMP: Primary | ICD-10-CM

## 2019-10-09 DIAGNOSIS — D49.3 NEOPLASM OF LEFT BREAST: ICD-10-CM

## 2019-10-09 PROCEDURE — 99213 OFFICE O/P EST LOW 20 MIN: CPT | Performed by: FAMILY MEDICINE

## 2019-10-09 NOTE — PROGRESS NOTES
Patient presents with:  Breast Lump: left painful to push on it, x 1 week       Subjective   HPI:   This is a 80year old male coming in for 1 week of sudden swelling under left nipple, no pain, but swollen and with deep touch it has mild pain, no redness Left breast lump/neoplasm, ultrasound and possible biopsy or drainage pending the results. He is aware he may need a mammogram with this but it has been going on for 1 week only.   Patient does have some memory issues but is pretty good about timing of thi

## 2019-10-10 ENCOUNTER — TELEPHONE (OUTPATIENT)
Dept: FAMILY MEDICINE CLINIC | Facility: CLINIC | Age: 84
End: 2019-10-10

## 2019-10-10 DIAGNOSIS — N63.20 LUMP OF LEFT BREAST: Primary | ICD-10-CM

## 2019-10-10 DIAGNOSIS — D49.3 BREAST NEOPLASM: ICD-10-CM

## 2019-10-10 NOTE — TELEPHONE ENCOUNTER
Dr Hugo Coronel put in a order for a mammogram for a lump on breast and they need a diagnostic mammogram for anyone over the age 22 with a palpable lump. Please update the order for patient.

## 2019-10-10 NOTE — TELEPHONE ENCOUNTER
Mammography requests order for diagnostic mammogram. Do you want to order right breast only or bilateral?    Routed to Dr Jamar Schroeder.

## 2019-10-11 NOTE — TELEPHONE ENCOUNTER
patient called and he cannot get in to get testing done until 10/24/19 as he has to go to the main hospital to get Diagnostic mammogram done. He wants to know if it is OK to wait that long?

## 2019-10-14 RX ORDER — SIMVASTATIN 80 MG
TABLET ORAL
Qty: 90 TABLET | Refills: 0 | Status: SHIPPED | OUTPATIENT
Start: 2019-10-14 | End: 2020-01-13

## 2019-10-14 NOTE — TELEPHONE ENCOUNTER
Requested Prescriptions     Pending Prescriptions Disp Refills   • SIMVASTATIN 80 MG Oral Tab [Pharmacy Med Name: Simvastatin 80 Mg Tab Nort] 90 tablet 0     Sig: TAKE 1 TABLET BY MOUTH NIGHTLY     LOV 10/9/2019     Patient was asked to follow-up in: 2 mon

## 2019-10-17 ENCOUNTER — PATIENT OUTREACH (OUTPATIENT)
Dept: CASE MANAGEMENT | Age: 84
End: 2019-10-17

## 2019-10-17 NOTE — PROGRESS NOTES
Contacted pt to notify we will be transitioning him to a new [address], gave new CCM information. Also notified I would be mailing a letter with all my information for his records. Will call back in a couple weeks to follow up.   Understanding

## 2019-10-22 ENCOUNTER — TELEPHONE (OUTPATIENT)
Dept: FAMILY MEDICINE CLINIC | Facility: CLINIC | Age: 84
End: 2019-10-22

## 2019-10-22 NOTE — TELEPHONE ENCOUNTER
Patient has no more hard lump and no more pain in the left breast.  He said he prayed to God last night that nothing would be wrong so that he could take care of his wife who has altzheimers and it went away.  He is very sincere about this and does not want

## 2019-10-23 NOTE — TELEPHONE ENCOUNTER
Pt notified to still get mammogram done. Happy to hear that no longer feel the large bump but testing would be advised.  Pt agrees to get mammogram done

## 2019-10-24 ENCOUNTER — HOSPITAL ENCOUNTER (OUTPATIENT)
Dept: MAMMOGRAPHY | Facility: HOSPITAL | Age: 84
Discharge: HOME | End: 2019-10-24
Attending: FAMILY MEDICINE
Payer: MEDICARE

## 2019-10-24 DIAGNOSIS — N63.20 LUMP OF LEFT BREAST: ICD-10-CM

## 2019-10-24 DIAGNOSIS — D49.3 BREAST NEOPLASM: ICD-10-CM

## 2019-10-24 PROCEDURE — 77065 DX MAMMO INCL CAD UNI: CPT | Performed by: FAMILY MEDICINE

## 2019-10-24 PROCEDURE — 76642 ULTRASOUND BREAST LIMITED: CPT | Performed by: FAMILY MEDICINE

## 2019-10-24 PROCEDURE — 77061 BREAST TOMOSYNTHESIS UNI: CPT | Performed by: FAMILY MEDICINE

## 2019-10-30 ENCOUNTER — PATIENT OUTREACH (OUTPATIENT)
Dept: CASE MANAGEMENT | Age: 84
End: 2019-10-30

## 2019-10-30 DIAGNOSIS — E78.2 MIXED HYPERLIPIDEMIA: ICD-10-CM

## 2019-10-30 DIAGNOSIS — N18.30 CKD (CHRONIC KIDNEY DISEASE) STAGE 3, GFR 30-59 ML/MIN (HCC): ICD-10-CM

## 2019-10-30 DIAGNOSIS — IMO0001 MILD AORTIC SCLEROSIS: ICD-10-CM

## 2019-10-30 DIAGNOSIS — E11.65 TYPE 2 DIABETES MELLITUS WITH HYPERGLYCEMIA, WITHOUT LONG-TERM CURRENT USE OF INSULIN (HCC): ICD-10-CM

## 2019-10-30 NOTE — PROGRESS NOTES
10/30/2019  Spoke to Tye Bright for CCM. Updates to patient care team/comments: n/a. Patient reported changes in medications: no changes. Med Adherence  Comment: pt reports taking all medications as prescribed. Health Maintenance:    Your Appoint education for healthy coping and dx. Reitirated the above from previous Care Manager. Next outreach we will follow up. Care Manager Follow Up: One month     Reason For Follow Up: review progress and or barriers towards patients goals.       Care

## 2019-10-31 PROCEDURE — 99490 CHRNC CARE MGMT STAFF 1ST 20: CPT

## 2019-11-19 ENCOUNTER — TELEPHONE (OUTPATIENT)
Dept: ORTHOPEDICS CLINIC | Facility: CLINIC | Age: 84
End: 2019-11-19

## 2019-11-19 DIAGNOSIS — M25.561 RIGHT KNEE PAIN, UNSPECIFIED CHRONICITY: Primary | ICD-10-CM

## 2019-11-19 DIAGNOSIS — M25.562 LEFT KNEE PAIN, UNSPECIFIED CHRONICITY: ICD-10-CM

## 2019-11-19 NOTE — TELEPHONE ENCOUNTER
Patient is being referred by Dr. Chela Zamudio for bilateral knee pain, last x-rays 9.13.19.  Please advise if additional images are needed prior to scheduling

## 2019-11-21 ENCOUNTER — OFFICE VISIT (OUTPATIENT)
Dept: FAMILY MEDICINE CLINIC | Facility: CLINIC | Age: 84
End: 2019-11-21
Payer: MEDICARE

## 2019-11-21 VITALS
BODY MASS INDEX: 21.76 KG/M2 | HEART RATE: 84 BPM | SYSTOLIC BLOOD PRESSURE: 124 MMHG | DIASTOLIC BLOOD PRESSURE: 54 MMHG | WEIGHT: 164.19 LBS | TEMPERATURE: 98 F | RESPIRATION RATE: 16 BRPM | HEIGHT: 72.75 IN

## 2019-11-21 DIAGNOSIS — M25.561 CHRONIC PAIN OF RIGHT KNEE: ICD-10-CM

## 2019-11-21 DIAGNOSIS — M54.50 ACUTE LEFT-SIDED LOW BACK PAIN WITHOUT SCIATICA: Primary | ICD-10-CM

## 2019-11-21 DIAGNOSIS — G89.29 CHRONIC PAIN OF RIGHT KNEE: ICD-10-CM

## 2019-11-21 PROCEDURE — 99213 OFFICE O/P EST LOW 20 MIN: CPT | Performed by: PHYSICIAN ASSISTANT

## 2019-11-21 RX ORDER — CYCLOBENZAPRINE HCL 5 MG
TABLET ORAL NIGHTLY PRN
Qty: 20 TABLET | Refills: 0 | Status: SHIPPED | OUTPATIENT
Start: 2019-11-21 | End: 2019-12-11 | Stop reason: ALTCHOICE

## 2019-11-21 RX ORDER — NAPROXEN 500 MG/1
500 TABLET ORAL 2 TIMES DAILY WITH MEALS
Qty: 30 TABLET | Refills: 0 | Status: SHIPPED | OUTPATIENT
Start: 2019-11-21 | End: 2020-07-21

## 2019-11-21 NOTE — TELEPHONE ENCOUNTER
[facility] notifying patient that we placed orders to have done prior to scheduling with our office

## 2019-11-21 NOTE — PATIENT INSTRUCTIONS
Amber Lopez  [address]  [place] [address] [phone]     Please call Dr. Corbin Mcardle for follow up on knee pain. He may be able to drain your knee which I think would give you some relief.

## 2019-11-22 ENCOUNTER — TELEPHONE (OUTPATIENT)
Dept: FAMILY MEDICINE CLINIC | Facility: CLINIC | Age: 84
End: 2019-11-22

## 2019-11-22 NOTE — TELEPHONE ENCOUNTER
Patient reports he woke up last night and had excruciating pain to the back of his head. Denies injury or fall. Took first dose of cyclobenzaprine in the evening. He is unable to turn head without pain. Pain rated \"11\" out of 10.  He denies visual disturb

## 2019-11-22 NOTE — TELEPHONE ENCOUNTER
Patient believes he is having a bad reaction to Cyclobenzaprine, during the night when he got up to go to the washroom he was barely able to get up and had excruciating pain in his head

## 2019-11-22 NOTE — TELEPHONE ENCOUNTER
Weakness is not a very common side effect of flexeril, but it can happen in 2-4% of people. I would recommend that he not take this any more. Has he been taking the ibuprofen that we had talked about? That should also help to treat the head pain.  If he fee

## 2019-11-23 NOTE — PROGRESS NOTES
Patient presents with:  Hip Pain: R hip pain beginning 4 days ago. Low Back Pain: Beginning 2 days ago.  Feels like a knife is sticking in his back       HISTORY OF PRESENT ILLNESS  Korin Augustin is a 80year old male who presents for evaluation of lef topically 2 (two) times daily. , Disp: 60 mL, Rfl: 3    Patient has no known allergies.     Patient Active Problem List:     Chronic right-sided low back pain without sciatica     Mixed hyperlipidemia     Type 2 diabetes mellitus with hyperglycemia, without 2/26/2015    Procedure: TRANSFORAMINAL EPIDURAL - LUMBAR;  Surgeon: Neel Casarez DO;  Location: [facility]   • Injection, anesthetic/steroid, transforaminal epidural; lumbar/sacral, single level Bilateral 2/26/2015    Procedure: TR Location: [address] [place] [address]   • Patient withough preoperative order for iv antibiotic surgical site infection prophylaxis. 9/24/2013    Procedure: HIP INJECTION (PAIN);   Surgeon: Courtney Sahu MD;  Location: [facility]IN [facility]   • Laretta Runner SLR.  - Right knee: Moderate-large suprapatellar effusion. Tender along superior patella. Slowed sit to stand. Normal flexion and extension of the knee. ASSESSMENT/ PLAN  1. Acute low back pain  2.  Chronic right knee pain  Suspect that he has been overc

## 2019-11-24 ENCOUNTER — APPOINTMENT (OUTPATIENT)
Dept: CT IMAGING | Facility: HOSPITAL | Age: 84
End: 2019-11-24
Attending: EMERGENCY MEDICINE
Payer: MEDICARE

## 2019-11-24 ENCOUNTER — HOSPITAL ENCOUNTER (EMERGENCY)
Facility: HOSPITAL | Age: 84
Discharge: HOME | End: 2019-11-24
Attending: EMERGENCY MEDICINE
Payer: MEDICARE

## 2019-11-24 VITALS
WEIGHT: 162.06 LBS | DIASTOLIC BLOOD PRESSURE: 60 MMHG | RESPIRATION RATE: 14 BRPM | SYSTOLIC BLOOD PRESSURE: 123 MMHG | BODY MASS INDEX: 22 KG/M2 | HEART RATE: 77 BPM | OXYGEN SATURATION: 94 %

## 2019-11-24 DIAGNOSIS — R51.9 NONINTRACTABLE HEADACHE, UNSPECIFIED CHRONICITY PATTERN, UNSPECIFIED HEADACHE TYPE: Primary | ICD-10-CM

## 2019-11-24 PROCEDURE — 85025 COMPLETE CBC W/AUTO DIFF WBC: CPT | Performed by: EMERGENCY MEDICINE

## 2019-11-24 PROCEDURE — 85730 THROMBOPLASTIN TIME PARTIAL: CPT | Performed by: EMERGENCY MEDICINE

## 2019-11-24 PROCEDURE — 99284 EMERGENCY DEPT VISIT MOD MDM: CPT

## 2019-11-24 PROCEDURE — 96361 HYDRATE IV INFUSION ADD-ON: CPT

## 2019-11-24 PROCEDURE — 70450 CT HEAD/BRAIN W/O DYE: CPT | Performed by: EMERGENCY MEDICINE

## 2019-11-24 PROCEDURE — 96374 THER/PROPH/DIAG INJ IV PUSH: CPT

## 2019-11-24 PROCEDURE — 80053 COMPREHEN METABOLIC PANEL: CPT | Performed by: EMERGENCY MEDICINE

## 2019-11-24 PROCEDURE — 85610 PROTHROMBIN TIME: CPT | Performed by: EMERGENCY MEDICINE

## 2019-11-24 RX ORDER — SODIUM CHLORIDE 9 MG/ML
1000 INJECTION, SOLUTION INTRAVENOUS ONCE
Status: COMPLETED | OUTPATIENT
Start: 2019-11-24 | End: 2019-11-24

## 2019-11-24 RX ORDER — HYDROMORPHONE HYDROCHLORIDE 1 MG/ML
0.5 INJECTION, SOLUTION INTRAMUSCULAR; INTRAVENOUS; SUBCUTANEOUS ONCE
Status: COMPLETED | OUTPATIENT
Start: 2019-11-24 | End: 2019-11-24

## 2019-11-24 NOTE — ED PROVIDER NOTES
Patient Seen in: [facility] Emergency Department      History   Patient presents with:  Headache (neurologic)    Stated Complaint: was seen here last week for hip and back pain and now has HA.     HPI    Patient is a 26-year-old male who presents [employer]   • HAND/FINGER SURGERY UNLISTED  2005    hand   • HIP INJECTION (PAIN) Right 9/24/2013    Performed by Freddie Hopkins MD at [address] / TRANSFORAMINAL EPIDURAL STEROID INJECTION N/A 10/4/2018    Performed by Kirit Moseley 123/60   Pulse 77   Resp 14   Wt 73.5 kg   SpO2 94%   BMI 21.53 kg/m²         Physical Exam  GENERAL: Well-developed, well-nourished male sitting up breathing easily in no apparent distress. Patient is nontoxic in appearance.   HEENT: Head is normocephalic Notable for the following components:    PTT 24.5 (*)     All other components within normal limits   CBC W/ DIFFERENTIAL - Abnormal; Notable for the following components:    RBC 3.52 (*)     HGB 10.4 (*)     HCT 31.7 (*)     All other components within no established blood work drawn including a CBC, chemistries, BUN/creatinine, and blood sugar all of which are unremarkable except for mild elevated BUN/creatinine of 49 and 1.8 consistent with some mild dehydration for which the patient was given IV fluids i WEEK        Medications Prescribed:  Discharge Medication List as of 11/24/2019  5:40 PM

## 2019-11-26 ENCOUNTER — TELEPHONE (OUTPATIENT)
Dept: FAMILY MEDICINE CLINIC | Facility: CLINIC | Age: 84
End: 2019-11-26

## 2019-11-26 NOTE — TELEPHONE ENCOUNTER
Spoke with Malachi Johnson regarding his visit to [facility] ED on 11/24/19 for posterior head pain. He was seen at his [de-identified] office 11/21/19 for R hip and back pain.   That pain was relieved by the medication he was given at his appointment but in the ED he was c/o

## 2019-11-27 ENCOUNTER — PATIENT OUTREACH (OUTPATIENT)
Dept: CASE MANAGEMENT | Age: 84
End: 2019-11-27

## 2019-11-27 DIAGNOSIS — N18.30 CKD (CHRONIC KIDNEY DISEASE) STAGE 3, GFR 30-59 ML/MIN (HCC): ICD-10-CM

## 2019-11-27 DIAGNOSIS — E78.2 MIXED HYPERLIPIDEMIA: ICD-10-CM

## 2019-11-27 DIAGNOSIS — E11.65 TYPE 2 DIABETES MELLITUS WITH HYPERGLYCEMIA, WITHOUT LONG-TERM CURRENT USE OF INSULIN (HCC): ICD-10-CM

## 2019-11-27 NOTE — PROGRESS NOTES
11/27/2019  Spoke to Jamison Mcardle for CCM. Updates to patient care team/comments: n/a. Patient reported changes in medications: no chnages. Med Adherence  Comment: pt reports taking all medications as prescribed. Health Maintenance:    Your Appoint

## 2019-11-30 PROCEDURE — 99490 CHRNC CARE MGMT STAFF 1ST 20: CPT

## 2019-12-06 ENCOUNTER — OFFICE VISIT (OUTPATIENT)
Dept: FAMILY MEDICINE CLINIC | Facility: CLINIC | Age: 84
End: 2019-12-06
Payer: MEDICARE

## 2019-12-06 VITALS
BODY MASS INDEX: 22 KG/M2 | RESPIRATION RATE: 14 BRPM | SYSTOLIC BLOOD PRESSURE: 110 MMHG | TEMPERATURE: 99 F | HEART RATE: 84 BPM | DIASTOLIC BLOOD PRESSURE: 64 MMHG | WEIGHT: 162 LBS

## 2019-12-06 DIAGNOSIS — M25.461 EFFUSION OF RIGHT KNEE: Primary | ICD-10-CM

## 2019-12-06 PROCEDURE — 99213 OFFICE O/P EST LOW 20 MIN: CPT | Performed by: FAMILY MEDICINE

## 2019-12-06 NOTE — PROGRESS NOTES
Chief Complaint:  Patient presents with:  Leg Pain: Pain on Both Legs, hard to walk     HPI:  This is a 80year old male patient presenting for Leg Pain (Pain on Both Legs, hard to walk )    Woke up last night with pain in his R leg, specifically his knee. Performed by Vanessa Infante MD at [address] / TRANSFORAMINAL EPIDURAL STEROID INJECTION N/A 10/4/2018    Performed by Chata Ramos DO at [facility] / TRANSFORAMINAL EPIDURAL STEROID INJECTION N tablet (15 mg total) by mouth daily. 90 tablet 1   • Clindamycin Phosphate 1 % External Solution Apply 1 Application topically 2 (two) times daily. 60 mL 3   • metRONIDAZOLE 0.75 % External Cream Apply 1 Application topically 2 (two) times daily.  45 g 2 demonstrated understanding.   Kathryn Mayfield DO  12/6/2019 11:21 AM  Family Medicine

## 2019-12-11 ENCOUNTER — OFFICE VISIT (OUTPATIENT)
Dept: FAMILY MEDICINE CLINIC | Facility: CLINIC | Age: 84
End: 2019-12-11
Payer: MEDICARE

## 2019-12-11 VITALS
BODY MASS INDEX: 20.86 KG/M2 | SYSTOLIC BLOOD PRESSURE: 114 MMHG | HEIGHT: 72 IN | TEMPERATURE: 97 F | RESPIRATION RATE: 16 BRPM | WEIGHT: 154 LBS | DIASTOLIC BLOOD PRESSURE: 58 MMHG | HEART RATE: 72 BPM

## 2019-12-11 DIAGNOSIS — N18.30 CKD (CHRONIC KIDNEY DISEASE) STAGE 3, GFR 30-59 ML/MIN (HCC): ICD-10-CM

## 2019-12-11 DIAGNOSIS — E78.2 MIXED HYPERLIPIDEMIA: ICD-10-CM

## 2019-12-11 DIAGNOSIS — E11.65 TYPE 2 DIABETES MELLITUS WITH HYPERGLYCEMIA, WITHOUT LONG-TERM CURRENT USE OF INSULIN (HCC): Primary | ICD-10-CM

## 2019-12-11 DIAGNOSIS — E11.40 TYPE 2 DIABETES MELLITUS WITH DIABETIC NEUROPATHY, WITHOUT LONG-TERM CURRENT USE OF INSULIN (HCC): Chronic | ICD-10-CM

## 2019-12-11 DIAGNOSIS — IMO0001 MILD AORTIC SCLEROSIS: ICD-10-CM

## 2019-12-11 PROCEDURE — 83036 HEMOGLOBIN GLYCOSYLATED A1C: CPT | Performed by: FAMILY MEDICINE

## 2019-12-11 PROCEDURE — 99214 OFFICE O/P EST MOD 30 MIN: CPT | Performed by: FAMILY MEDICINE

## 2019-12-11 RX ORDER — GAUZE BANDAGE 4" X 4"
BANDAGE TOPICAL
COMMUNITY
End: 2022-01-14

## 2019-12-11 RX ORDER — MULTIVITAMIN
TABLET ORAL
COMMUNITY

## 2019-12-11 NOTE — PROGRESS NOTES
Discussed A1C results with [place] by phone telling him it is slightly better but still elevated per . To be checked in 3 months. [place] verbalized understanding.

## 2019-12-11 NOTE — PROGRESS NOTES
HPI:   Patient presents with:  Diabetes    Teagan Cuellar is a 80year old male who presents for recheck of his diabetes. Subjective    Recent knee surgery with good activity. Back pain, seeing Dr Gasper Reyna but back persitst so no longer seeing him.    Lab are 2+ on the right side and 2+ on the left side. Posterior tibial pulses are 2+ on the right side and 2+ on the left side. Edema not present. Pulmonary/Chest: Effort normal and breath sounds normal. No respiratory distress. He has no wheezes. controlled. Recommendations are: continue present meds, lose weight by increased dietary compliance and exercise and will check labs as ordered.     Lab Results   Component Value Date    A1C 8.4 (A) 12/11/2019    A1C 8.5 (H) 09/09/2019      Blood Sugar

## 2019-12-12 ENCOUNTER — TELEPHONE (OUTPATIENT)
Dept: FAMILY MEDICINE CLINIC | Facility: CLINIC | Age: 84
End: 2019-12-12

## 2019-12-12 NOTE — ASSESSMENT & PLAN NOTE
Stable GFR.    Lab Results   Component Value Date/Time    CREATSERUM 1.85 (H) 11/24/2019 02:55 PM    GFR 40 (L) 10/12/2017 10:31 AM    GFRNAA 32 (L) 11/24/2019 02:55 PM

## 2019-12-12 NOTE — TELEPHONE ENCOUNTER
Patient called states saw Dr Cinthia Stokes yesterday (12/11/19) and thought Dr Cinthia Stokes was going to prescribe a new medication?

## 2019-12-12 NOTE — ASSESSMENT & PLAN NOTE
As for his Diabetes, it is no significant medication side effects noted, borderline controlled. Recommendations are: continue present meds, lose weight by increased dietary compliance and exercise and will check labs as ordered.     Lab Results   Compon

## 2019-12-13 NOTE — TELEPHONE ENCOUNTER
Patient notified. He will plan to discuss further at next OV. He does not recall the medication or what it was for.

## 2019-12-21 ENCOUNTER — HOSPITAL ENCOUNTER (EMERGENCY)
Facility: HOSPITAL | Age: 84
Discharge: HOME | End: 2019-12-21
Attending: EMERGENCY MEDICINE
Payer: MEDICARE

## 2019-12-21 VITALS
RESPIRATION RATE: 20 BRPM | BODY MASS INDEX: 21.2 KG/M2 | SYSTOLIC BLOOD PRESSURE: 115 MMHG | TEMPERATURE: 100 F | OXYGEN SATURATION: 96 % | HEIGHT: 73 IN | DIASTOLIC BLOOD PRESSURE: 52 MMHG | HEART RATE: 76 BPM | WEIGHT: 160 LBS

## 2019-12-21 DIAGNOSIS — M25.461 EFFUSION, RIGHT KNEE: Primary | ICD-10-CM

## 2019-12-21 PROCEDURE — 99283 EMERGENCY DEPT VISIT LOW MDM: CPT

## 2019-12-21 PROCEDURE — 20610 DRAIN/INJ JOINT/BURSA W/O US: CPT

## 2019-12-21 PROCEDURE — 87040 BLOOD CULTURE FOR BACTERIA: CPT | Performed by: EMERGENCY MEDICINE

## 2019-12-21 PROCEDURE — 89051 BODY FLUID CELL COUNT: CPT | Performed by: EMERGENCY MEDICINE

## 2019-12-21 PROCEDURE — 36415 COLL VENOUS BLD VENIPUNCTURE: CPT

## 2019-12-21 PROCEDURE — 87070 CULTURE OTHR SPECIMN AEROBIC: CPT | Performed by: EMERGENCY MEDICINE

## 2019-12-21 PROCEDURE — 89060 EXAM SYNOVIAL FLUID CRYSTALS: CPT | Performed by: EMERGENCY MEDICINE

## 2019-12-21 PROCEDURE — 87205 SMEAR GRAM STAIN: CPT | Performed by: EMERGENCY MEDICINE

## 2019-12-21 PROCEDURE — 80053 COMPREHEN METABOLIC PANEL: CPT | Performed by: EMERGENCY MEDICINE

## 2019-12-21 PROCEDURE — 89050 BODY FLUID CELL COUNT: CPT | Performed by: EMERGENCY MEDICINE

## 2019-12-21 PROCEDURE — 83605 ASSAY OF LACTIC ACID: CPT | Performed by: EMERGENCY MEDICINE

## 2019-12-21 PROCEDURE — 85025 COMPLETE CBC W/AUTO DIFF WBC: CPT | Performed by: EMERGENCY MEDICINE

## 2019-12-21 NOTE — ED PROVIDER NOTES
Patient Seen in: [facility] Emergency Department      History   Patient presents with:  Pain    Stated Complaint: right knee pain and swelling, recent cortisone injection    HPI    Patient here concerned about right knee pain and swelling worsening INJECTION N/A 6/21/2016    Performed by Christene Buerger, DO at [facility] / TRANSFORAMINAL EPIDURAL STEROID INJECTION Bilateral 8/11/2015    [facility] tenderness. Neurological: Pt is alert and oriented to person, place, and time. No cranial nerve deficit. Skin: Skin is warm and dry. Psychiatric: Normal mood and affect. Thought content normal.       Right knee:  It is significantly swollen compar Status                     ---------                               -----------         ------                     CELL COUNT AND CRYSTALS . Mathew Ortega .[[id number]]                      In process                   Please view results for these tests on the individual

## 2019-12-21 NOTE — ED INITIAL ASSESSMENT (HPI)
Patient presents for evaluation of right knee pain and swelling. He reports that he had a cortisone injection done about 1 week ago and felt better at first but starting last night pain has been unbareable. Unable to bare weight. Denies known fever.

## 2019-12-27 ENCOUNTER — PATIENT OUTREACH (OUTPATIENT)
Dept: CASE MANAGEMENT | Age: 84
End: 2019-12-27

## 2020-01-03 ENCOUNTER — TELEPHONE (OUTPATIENT)
Dept: FAMILY MEDICINE CLINIC | Facility: CLINIC | Age: 85
End: 2020-01-03

## 2020-01-03 NOTE — TELEPHONE ENCOUNTER
Called and talked to patient suggested he take Ibuprofen 600 mg every 6 hours to wear a support, which he already is. When he gets home to elevate and ice the knee and if not better by next week to call to be seen.

## 2020-01-03 NOTE — TELEPHONE ENCOUNTER
Patient called states his right knee has been hurting due to lots of walking visiting his wife at the hospital, wants to know if anything he can do for it?

## 2020-01-06 ENCOUNTER — TELEPHONE (OUTPATIENT)
Dept: FAMILY MEDICINE CLINIC | Facility: CLINIC | Age: 85
End: 2020-01-06

## 2020-01-06 NOTE — TELEPHONE ENCOUNTER
patient called and the pain is much better so he will continue using the ibuprofen and elevating the knee and if the pain comes back he will call to be seen

## 2020-01-13 RX ORDER — SIMVASTATIN 80 MG
TABLET ORAL
Qty: 90 TABLET | Refills: 3 | Status: SHIPPED | OUTPATIENT
Start: 2020-01-13 | End: 2021-03-11

## 2020-01-13 NOTE — TELEPHONE ENCOUNTER
Requested Prescriptions     Pending Prescriptions Disp Refills   • SIMVASTATIN 80 MG Oral Tab [Pharmacy Med Name: Simvastatin 80 Mg Tab Nort] 90 tablet 0     Sig: TAKE 1 TABLET BY MOUTH NIGHTLY     LOV 12/11/2019     Patient was asked to follow-up in: 3 mo

## 2020-01-23 ENCOUNTER — PATIENT OUTREACH (OUTPATIENT)
Dept: CASE MANAGEMENT | Age: 85
End: 2020-01-23

## 2020-01-29 ENCOUNTER — TELEPHONE (OUTPATIENT)
Dept: FAMILY MEDICINE CLINIC | Facility: CLINIC | Age: 85
End: 2020-01-29

## 2020-01-29 NOTE — TELEPHONE ENCOUNTER
Received fax with patient's X-Ray results from [facility], placed in Dr. Eugene George in box

## 2020-02-12 ENCOUNTER — TELEPHONE (OUTPATIENT)
Dept: FAMILY MEDICINE CLINIC | Facility: CLINIC | Age: 85
End: 2020-02-12

## 2020-02-12 NOTE — TELEPHONE ENCOUNTER
Received fax from [email or web address] to complete a Attending Physician Statement. Office notes printed and attached. Paperwork in triage.

## 2020-02-13 ENCOUNTER — PATIENT OUTREACH (OUTPATIENT)
Dept: CASE MANAGEMENT | Age: 85
End: 2020-02-13

## 2020-02-13 DIAGNOSIS — E78.2 MIXED HYPERLIPIDEMIA: ICD-10-CM

## 2020-02-13 DIAGNOSIS — E11.65 TYPE 2 DIABETES MELLITUS WITH HYPERGLYCEMIA, WITHOUT LONG-TERM CURRENT USE OF INSULIN (HCC): ICD-10-CM

## 2020-02-13 DIAGNOSIS — N18.30 CKD (CHRONIC KIDNEY DISEASE) STAGE 3, GFR 30-59 ML/MIN (HCC): ICD-10-CM

## 2020-02-13 NOTE — PROGRESS NOTES
2/13/2020  Spoke to Duc Cox for CCM. Updates to patient care team/comments: n/a. Patient reported changes in medications: no changes. Med Adherence  Comment: pt reports taking all medications as prescribed. Health Maintenance:    Your Appointm goals. Care Managers Interventions: Continue to provide encouragement, support, and education for healthy coping and dx.   Time Spent This Encounter Total: 20 min medical record review, telephone communication, care plan updates where needed, education,

## 2020-02-25 ENCOUNTER — TELEPHONE (OUTPATIENT)
Dept: FAMILY MEDICINE CLINIC | Facility: CLINIC | Age: 85
End: 2020-02-25

## 2020-02-25 NOTE — TELEPHONE ENCOUNTER
Pt wife broke leg and was in rehab and he insisted on going every day and he got worn down and ended up in the Hospital in [facility] [place] [facility] [place] and now he is in rehab now wife is at [facility] and now patient is at [facility] and she thinks he is declini

## 2020-02-27 NOTE — TELEPHONE ENCOUNTER
Left a message about this.   It is okay for this short-term stay but we may need to work on a longer term option

## 2020-02-29 PROCEDURE — 99490 CHRNC CARE MGMT STAFF 1ST 20: CPT

## 2020-03-23 ENCOUNTER — PATIENT OUTREACH (OUTPATIENT)
Dept: CASE MANAGEMENT | Age: 85
End: 2020-03-23

## 2020-03-23 NOTE — PROGRESS NOTES
Attempted to contact pt no answer left detailed message for pt to call back. Also tried contacting son.     Total time spent with patient including chart review: 2  Time spent with patient this month: 2    Total time spent with communication and chart revi

## 2020-03-31 ENCOUNTER — TELEPHONE (OUTPATIENT)
Dept: FAMILY MEDICINE CLINIC | Facility: CLINIC | Age: 85
End: 2020-03-31

## 2020-03-31 NOTE — TELEPHONE ENCOUNTER
Fax received from [address] [place] [address] that Furosemide 20 mg,Gabapentin 400 mg, an Simvastatin 80 mg are non-covered medications. It states that no part D coverage has been found.     Left message on answering machine of daughterJaneth to call t

## 2020-03-31 NOTE — TELEPHONE ENCOUNTER
Called [facility] and told them that patient gets his medication through [place] so they will call the family and try to get this straightened up.

## 2020-04-20 ENCOUNTER — PATIENT OUTREACH (OUTPATIENT)
Dept: CASE MANAGEMENT | Age: 85
End: 2020-04-20

## 2020-04-22 ENCOUNTER — TELEPHONE (OUTPATIENT)
Dept: FAMILY MEDICINE CLINIC | Facility: CLINIC | Age: 85
End: 2020-04-22

## 2020-04-22 DIAGNOSIS — I87.2 VENOUS INSUFFICIENCY OF BOTH LOWER EXTREMITIES: Primary | ICD-10-CM

## 2020-04-22 DIAGNOSIS — E11.40 TYPE 2 DIABETES MELLITUS WITH DIABETIC NEUROPATHY, WITHOUT LONG-TERM CURRENT USE OF INSULIN (HCC): Chronic | ICD-10-CM

## 2020-04-22 DIAGNOSIS — M47.817 LUMBOSACRAL SPONDYLOSIS WITHOUT MYELOPATHY: ICD-10-CM

## 2020-04-22 DIAGNOSIS — M48.061 SPINAL STENOSIS, LUMBAR REGION, WITHOUT NEUROGENIC CLAUDICATION: Chronic | ICD-10-CM

## 2020-04-22 DIAGNOSIS — M54.50 CHRONIC RIGHT-SIDED LOW BACK PAIN WITHOUT SCIATICA: ICD-10-CM

## 2020-04-22 DIAGNOSIS — G89.29 CHRONIC RIGHT-SIDED LOW BACK PAIN WITHOUT SCIATICA: ICD-10-CM

## 2020-04-22 DIAGNOSIS — M43.10 SPONDYLOLISTHESIS, GRADE 1: ICD-10-CM

## 2020-04-22 DIAGNOSIS — M51.26 DISPLACEMENT OF LUMBAR INTERVERTEBRAL DISC WITHOUT MYELOPATHY: ICD-10-CM

## 2020-04-22 NOTE — TELEPHONE ENCOUNTER
Talked to Dr Elen Garcia he gave OK to send orders to [facility] for OT/PT forms sent as requested

## 2020-04-22 NOTE — TELEPHONE ENCOUNTER
Received fax from [facility] requesting order for PT/OT to evaluate and treat D/T weakness and lack of mobility.  Fax in triage

## 2020-05-12 ENCOUNTER — TELEPHONE (OUTPATIENT)
Dept: FAMILY MEDICINE CLINIC | Facility: CLINIC | Age: 85
End: 2020-05-12

## 2020-05-12 NOTE — TELEPHONE ENCOUNTER
Received form from [address] [place] [address] requesting to review ,sign and fax back to [phone].  In Dr Efraín Holbrook

## 2020-05-20 ENCOUNTER — PATIENT OUTREACH (OUTPATIENT)
Dept: CASE MANAGEMENT | Age: 85
End: 2020-05-20

## 2020-05-20 NOTE — PROGRESS NOTES
Attempted to contact pt's son no answer left detailed message for pt to call back.    Total time spent with patient including chart review: 2  Time spent with patient this month: 2    Total time spent with communication and chart review this month to date:

## 2020-06-03 ENCOUNTER — PATIENT OUTREACH (OUTPATIENT)
Dept: CASE MANAGEMENT | Age: 85
End: 2020-06-03

## 2020-06-18 ENCOUNTER — TELEPHONE (OUTPATIENT)
Dept: FAMILY MEDICINE CLINIC | Facility: CLINIC | Age: 85
End: 2020-06-18

## 2020-06-18 DIAGNOSIS — N18.30 CKD (CHRONIC KIDNEY DISEASE) STAGE 3, GFR 30-59 ML/MIN (HCC): ICD-10-CM

## 2020-06-18 DIAGNOSIS — E11.65 TYPE 2 DIABETES MELLITUS WITH HYPERGLYCEMIA, WITHOUT LONG-TERM CURRENT USE OF INSULIN (HCC): Primary | ICD-10-CM

## 2020-06-18 NOTE — TELEPHONE ENCOUNTER
[facility] S[facility] is calling they said the daughter is calling for an A1C to be ordered to be completed by them. Please fax to [phone].

## 2020-06-18 NOTE — TELEPHONE ENCOUNTER
Daughter called nursing facility asking them to get an A1C done for Pt. LOV 12/11/19- was to f/u in 3 months  Last A1C 8.4 on 12/11/19. Please advise- do you want Pt to schedule an appt or do you just want labs? Do you want any other labs done?

## 2020-06-18 NOTE — TELEPHONE ENCOUNTER
Diagnoses and all orders for this visit:    Type 2 diabetes mellitus with hyperglycemia, without long-term current use of insulin (HCC)  -     COMP METABOLIC PANEL (14); Future  -     LIPID PANEL;  Future  -     HEMOGLOBIN A1C; Future    CKD (chronic kidney

## 2020-06-19 NOTE — TELEPHONE ENCOUNTER
Spoke with Arabella Whalen at [facility] telling her labs were ordered for Elissa Reina by Snehal Xiong. Faxed orders to [phone]. Please call and make and virtual telephone visit or a video visit appointment for Elissa Reina, a resident at [facility].  (His daughter

## 2020-06-22 ENCOUNTER — PATIENT MESSAGE (OUTPATIENT)
Dept: FAMILY MEDICINE CLINIC | Facility: CLINIC | Age: 85
End: 2020-06-22

## 2020-06-22 DIAGNOSIS — N18.30 CKD (CHRONIC KIDNEY DISEASE) STAGE 3, GFR 30-59 ML/MIN (HCC): ICD-10-CM

## 2020-06-22 DIAGNOSIS — E11.65 TYPE 2 DIABETES MELLITUS WITH HYPERGLYCEMIA, WITHOUT LONG-TERM CURRENT USE OF INSULIN (HCC): Primary | ICD-10-CM

## 2020-06-22 DIAGNOSIS — D64.9 ANEMIA, UNSPECIFIED TYPE: ICD-10-CM

## 2020-06-22 NOTE — TELEPHONE ENCOUNTER
[address] [employer] [facility] at [phone] and [facility] She will ask Suraj Rodriguez what time he can do a virtual visit and call us back.  (Thursday 6/25/20 was offered in the afternoon)

## 2020-06-22 NOTE — TELEPHONE ENCOUNTER
[address] [facility] called back and the patient scheduled on 6/25/20 at 3 pm with Dr. Baron Cagle for a virtual call.  [phone]

## 2020-06-23 ENCOUNTER — TELEPHONE (OUTPATIENT)
Dept: FAMILY MEDICINE CLINIC | Facility: CLINIC | Age: 85
End: 2020-06-23

## 2020-06-23 NOTE — TELEPHONE ENCOUNTER
From: Tracy Bain  To: Helen Mckinnon MD  Sent: 6/22/2020 9:07 PM CDT  Subject: Other    Hi Dr Sukhi Mendiola -    I received notification that there has been a phone conversation (?) set up with my dad with you on Thursday. What is that for?  He mandie jolley

## 2020-06-24 RX ORDER — FUROSEMIDE 20 MG/1
TABLET ORAL
COMMUNITY
Start: 2020-06-04 | End: 2020-07-20

## 2020-06-25 ENCOUNTER — VIRTUAL PHONE E/M (OUTPATIENT)
Dept: FAMILY MEDICINE CLINIC | Facility: CLINIC | Age: 85
End: 2020-06-25
Payer: MEDICARE

## 2020-06-25 DIAGNOSIS — N18.30 CKD (CHRONIC KIDNEY DISEASE) STAGE 3, GFR 30-59 ML/MIN (HCC): ICD-10-CM

## 2020-06-25 DIAGNOSIS — E11.65 TYPE 2 DIABETES MELLITUS WITH HYPERGLYCEMIA, WITHOUT LONG-TERM CURRENT USE OF INSULIN (HCC): Primary | ICD-10-CM

## 2020-06-25 DIAGNOSIS — E78.2 MIXED HYPERLIPIDEMIA: ICD-10-CM

## 2020-06-25 DIAGNOSIS — E11.40 TYPE 2 DIABETES MELLITUS WITH DIABETIC NEUROPATHY, WITHOUT LONG-TERM CURRENT USE OF INSULIN (HCC): Chronic | ICD-10-CM

## 2020-06-25 PROCEDURE — 99442 PHONE E/M BY PHYS 11-20 MIN: CPT | Performed by: FAMILY MEDICINE

## 2020-06-25 NOTE — PROGRESS NOTES
Virtual/Telephone Check-In    Clarissa Harris verbally consents to a Virtual/Telephone Check-In service on 06/25/20. Patient has been referred to the [facility] website at [email or web address] to review the yearly Consent to Treat document.   Patient under of 12/21/19: 73\". Weight as of 12/21/19: 160 lb (72.6 kg). Physical Exam   Nursing note and vitals reviewed. Speaking in full sentences, no increased work of breathing. A&O x 3.    Assessment    ASSESSMENT AND PLAN:       The patient indicates und Assessment & Plan     Stable GFR.  Continue present management          Relevant Orders    CBC WITH DIFFERENTIAL WITH PLATELET       stop pioglitazone and consider PT for knee and shoulder    Repeat labns in 1 months, and reassess for AWV in 3 months  Retur

## 2020-06-26 NOTE — TELEPHONE ENCOUNTER
In my InBasket, I have a printed order. Please fax to [facility].  Thanks and stay well, Kylie Og MD

## 2020-07-20 ENCOUNTER — TELEPHONE (OUTPATIENT)
Dept: FAMILY MEDICINE CLINIC | Facility: CLINIC | Age: 85
End: 2020-07-20

## 2020-07-20 RX ORDER — FUROSEMIDE 40 MG/1
40 TABLET ORAL DAILY
Qty: 90 TABLET | Refills: 3 | COMMUNITY
Start: 2020-07-20 | End: 2020-07-30

## 2020-07-20 NOTE — TELEPHONE ENCOUNTER
Received call from Raine Mane at [facility] requesting to speak with nurse, she would like to clarify medications and they're dosage.

## 2020-07-20 NOTE — TELEPHONE ENCOUNTER
Lasix has no directions and the last dosage was 40 mg bid not 20 mg will see what Dr Trenton Knutson says

## 2020-07-21 ENCOUNTER — TELEPHONE (OUTPATIENT)
Dept: FAMILY MEDICINE CLINIC | Facility: CLINIC | Age: 85
End: 2020-07-21

## 2020-07-21 RX ORDER — DOCUSATE SODIUM 100 MG/1
100 CAPSULE, LIQUID FILLED ORAL 2 TIMES DAILY
COMMUNITY

## 2020-07-21 RX ORDER — POLYETHYLENE GLYCOL 3350 17 G/17G
17 POWDER, FOR SOLUTION ORAL DAILY
COMMUNITY
End: 2022-02-04

## 2020-07-21 RX ORDER — ACETAMINOPHEN 325 MG/1
325 TABLET ORAL EVERY 6 HOURS PRN
COMMUNITY

## 2020-07-21 RX ORDER — MELATONIN
325 2 TIMES DAILY WITH MEALS
COMMUNITY
End: 2022-01-14

## 2020-07-21 NOTE — TELEPHONE ENCOUNTER
It was 40 BID, and it  at [facility] in March, it was BID< but OK for daily at this time    Water Pills          furosemide 40 MG Oral Tab

## 2020-07-21 NOTE — TELEPHONE ENCOUNTER
Ida from [address] [place] [address] calling to review med list. The follow are on their med list, but not ours. He would like Dr. Maria Eugenia Ladd to confirm whether it is okay patient to continue the below medications/supplements as written?     Ferrous Sulfate 325mg BID  Calciu

## 2020-07-21 NOTE — TELEPHONE ENCOUNTER
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 Please call

## 2020-07-30 ENCOUNTER — TELEPHONE (OUTPATIENT)
Dept: FAMILY MEDICINE CLINIC | Facility: CLINIC | Age: 85
End: 2020-07-30

## 2020-07-30 RX ORDER — FUROSEMIDE 40 MG/1
40 TABLET ORAL DAILY
Qty: 90 TABLET | Refills: 3 | Status: SHIPPED | OUTPATIENT
Start: 2020-07-30 | End: 2022-01-14

## 2020-07-30 NOTE — TELEPHONE ENCOUNTER
Pt nursing home calling he needs us to send a Rx for his furosemide 40 MG Oral Tab to [facility] in [facility] [place] [phone]  Phone number [phone]

## 2020-07-30 NOTE — TELEPHONE ENCOUNTER
Spoke with SHI Samuel at nursing home.  Rx for Furosemide reissued to [facility] per request.

## 2020-09-08 ENCOUNTER — TELEPHONE (OUTPATIENT)
Dept: FAMILY MEDICINE CLINIC | Facility: CLINIC | Age: 85
End: 2020-09-08

## 2020-09-08 NOTE — TELEPHONE ENCOUNTER
Received faxed lab work results from [address] [place] [address]. Placed in Dr. David Clay' in box for review.

## 2020-09-09 NOTE — TELEPHONE ENCOUNTER
White count 5.6, hemoglobin 10.4 up from 10.1, platelets 054, glucose up to 312 again with recent A1c of 8.4. Sodium 128 but likely due to the sodium    Repeat CBC and CMP and A1c in 2-3 weeks and OK to notify DTR's. Gather Appt message sent to pt/Dtr.  Thanks

## 2020-09-17 ENCOUNTER — MED REC SCAN ONLY (OUTPATIENT)
Dept: FAMILY MEDICINE CLINIC | Facility: CLINIC | Age: 85
End: 2020-09-17

## 2020-09-24 ENCOUNTER — TELEPHONE (OUTPATIENT)
Dept: FAMILY MEDICINE CLINIC | Facility: CLINIC | Age: 85
End: 2020-09-24

## 2020-09-24 DIAGNOSIS — N18.30 CKD (CHRONIC KIDNEY DISEASE) STAGE 3, GFR 30-59 ML/MIN (HCC): Primary | ICD-10-CM

## 2020-09-24 DIAGNOSIS — D64.9 ANEMIA, UNSPECIFIED TYPE: ICD-10-CM

## 2020-09-24 DIAGNOSIS — E11.65 TYPE 2 DIABETES MELLITUS WITH HYPERGLYCEMIA, WITHOUT LONG-TERM CURRENT USE OF INSULIN (HCC): ICD-10-CM

## 2020-09-24 NOTE — TELEPHONE ENCOUNTER
Hemoglobin 10.8 which is improved, creatinine 1.9..  Good improvement, will repeat again in 2 months.     Please notify daughter, okay to use MyChart and okay to fax back to the assisted living place

## 2020-09-24 NOTE — TELEPHONE ENCOUNTER
Results reviewed with daughter.  Lab orders faxed to [facility] [phone] for repeat labs in 1 month

## 2020-10-30 ENCOUNTER — TELEPHONE (OUTPATIENT)
Dept: FAMILY MEDICINE CLINIC | Facility: CLINIC | Age: 85
End: 2020-10-30

## 2020-10-30 DIAGNOSIS — E11.65 TYPE 2 DIABETES MELLITUS WITH HYPERGLYCEMIA, WITHOUT LONG-TERM CURRENT USE OF INSULIN (HCC): Primary | ICD-10-CM

## 2020-10-30 NOTE — TELEPHONE ENCOUNTER
Diagnoses and all orders for this visit:    Type 2 diabetes mellitus with hyperglycemia, without long-term current use of insulin (HCC)  -     HEMOGLOBIN A1C; Future         OK to notify.  Thanks, Jeramy Tirado MD

## 2020-10-30 NOTE — TELEPHONE ENCOUNTER
Pt daughter telling his assissted living that pt is due to A! C she got a text message that she got from us that he needed one.    Can we add a order for A1C for the assessited living

## 2020-11-30 ENCOUNTER — TELEPHONE (OUTPATIENT)
Dept: FAMILY MEDICINE CLINIC | Facility: CLINIC | Age: 85
End: 2020-11-30

## 2020-11-30 NOTE — TELEPHONE ENCOUNTER
Received Labs from [address] [place] [address],[address] for the patient for [address] [place] [address]. Paperwork in Dr. Eugene George in box for review.

## 2020-12-01 NOTE — TELEPHONE ENCOUNTER
Hematocrit stable at 11.1, creatinine 1.85, filtration rate is 37 GFR, calcium 10.4, letter in my in basket fax back and will repeat these in 1 month

## 2020-12-02 NOTE — TELEPHONE ENCOUNTER
[address] [place] [address] and talked to [facility] the nurse caring for the patient gave results and recommendations, they will reorder labs for one month

## 2020-12-04 ENCOUNTER — TELEPHONE (OUTPATIENT)
Dept: FAMILY MEDICINE CLINIC | Facility: CLINIC | Age: 85
End: 2020-12-04

## 2020-12-04 NOTE — TELEPHONE ENCOUNTER
Received call from nurse at [facility] requesting to speak with nurse to clarify lab orders, wants to know if Dr Balbina Garcia wants to repeat labs in 1 or 2mnths?

## 2020-12-15 ENCOUNTER — TELEPHONE (OUTPATIENT)
Dept: FAMILY MEDICINE CLINIC | Facility: CLINIC | Age: 85
End: 2020-12-15

## 2020-12-15 NOTE — TELEPHONE ENCOUNTER
Jing reporting patient is scheduled for a blood draw for A1C, lipid, CMP tomorrow, however, he's also scheduled on 2/1/21 for this. Patient had labs on 11/30 and was told to repeat in 2 months.  He will cancel the draw for tomorrow and have pt complete in

## 2020-12-15 NOTE — TELEPHONE ENCOUNTER
Esau is calling from [facility] and they need to speak to a nurse. Would not give details.

## 2021-02-02 LAB — AMB EXT HGBA1C: 9 %

## 2021-02-03 ENCOUNTER — TELEPHONE (OUTPATIENT)
Dept: FAMILY MEDICINE CLINIC | Facility: CLINIC | Age: 86
End: 2021-02-03

## 2021-02-03 NOTE — TELEPHONE ENCOUNTER
Received fax from [facility] with patient's test results, review, sign and fax back to [phone].  Fax placed in Dr. Nimesh Forrester in box

## 2021-02-03 NOTE — TELEPHONE ENCOUNTER
Hemoglobin 11.5, sodium is up to 135, potassium 4.3, creatinine 1.67 with a GFR of 41. Glucose elevated at 383 and unreadable A1c that could be anywhere from 5.8-9.8 because it was received on a poor fax transmission. No future appointments.   Last appointm

## 2021-02-03 NOTE — TELEPHONE ENCOUNTER
Spoke with Pt's daughter advised of notes. Pt scheduled 02/10/21 @ 2:30pm with Dr. Maria Eugenia Ladd.

## 2021-02-06 DIAGNOSIS — Z23 NEED FOR VACCINATION: ICD-10-CM

## 2021-02-10 ENCOUNTER — OFFICE VISIT (OUTPATIENT)
Dept: FAMILY MEDICINE CLINIC | Facility: CLINIC | Age: 86
End: 2021-02-10
Payer: MEDICARE

## 2021-02-10 VITALS
SYSTOLIC BLOOD PRESSURE: 120 MMHG | TEMPERATURE: 98 F | DIASTOLIC BLOOD PRESSURE: 62 MMHG | WEIGHT: 180.19 LBS | HEIGHT: 73 IN | HEART RATE: 76 BPM | RESPIRATION RATE: 16 BRPM | BODY MASS INDEX: 23.88 KG/M2

## 2021-02-10 DIAGNOSIS — R20.0 NUMBNESS AND TINGLING IN LEFT HAND: ICD-10-CM

## 2021-02-10 DIAGNOSIS — N18.31 STAGE 3A CHRONIC KIDNEY DISEASE (HCC): ICD-10-CM

## 2021-02-10 DIAGNOSIS — E11.65 TYPE 2 DIABETES MELLITUS WITH HYPERGLYCEMIA, WITHOUT LONG-TERM CURRENT USE OF INSULIN (HCC): Primary | ICD-10-CM

## 2021-02-10 DIAGNOSIS — I70.0 ATHEROSCLEROSIS OF AORTA (HCC): ICD-10-CM

## 2021-02-10 DIAGNOSIS — R20.2 NUMBNESS AND TINGLING IN LEFT HAND: ICD-10-CM

## 2021-02-10 DIAGNOSIS — E11.40 TYPE 2 DIABETES MELLITUS WITH DIABETIC NEUROPATHY, WITHOUT LONG-TERM CURRENT USE OF INSULIN (HCC): Chronic | ICD-10-CM

## 2021-02-10 PROCEDURE — 99214 OFFICE O/P EST MOD 30 MIN: CPT | Performed by: FAMILY MEDICINE

## 2021-02-10 RX ORDER — PIOGLITAZONEHYDROCHLORIDE 15 MG/1
15 TABLET ORAL DAILY
Qty: 90 TABLET | Refills: 1 | Status: SHIPPED | OUTPATIENT
Start: 2021-02-10 | End: 2021-03-31

## 2021-02-10 NOTE — PATIENT INSTRUCTIONS
Hemoglobin 11.5, sodium is up to 135, potassium 4.3, creatinine 1.67 with a GFR of 41. Glucose elevated at 383 and unreadable A1c that could be anywhere from 5.8-9.8 because it was received on a poor fax transmission.       Diagnoses and all orders for [facility]

## 2021-02-10 NOTE — PROGRESS NOTES
HPI:   Patient presents with:  Lab: discuss blood work     Amaris Salgado is a 80year old male who presents for recheck of his diabetes. Subjective    Doing well overall, but poot Diabetes Mellitus control. Increased A1c.    Last A1c value was 9% done Cardiovascular: Normal rate, regular rhythm and normal heart sounds. No murmur heard. Pulmonary/Chest: Effort normal and breath sounds normal. No respiratory distress. Neurological: He is alert and oriented to person, place, and time.    Skin: Skin

## 2021-02-15 ENCOUNTER — PATIENT MESSAGE (OUTPATIENT)
Dept: FAMILY MEDICINE CLINIC | Facility: CLINIC | Age: 86
End: 2021-02-15

## 2021-02-15 NOTE — TELEPHONE ENCOUNTER
From: Anand Delaney  To: Renae Lancaster MD  Sent: 2/15/2021 2:38 PM CST  Subject: Other    Hi Dr Elen Garcia -    For your records. ... My dad did get the Keith Conti vaccine on February 11th. The second dose is scheduled for March 4th.     Mauro Sepulveda

## 2021-03-11 RX ORDER — SIMVASTATIN 80 MG
80 TABLET ORAL NIGHTLY
Qty: 30 TABLET | Refills: 0 | Status: SHIPPED | OUTPATIENT
Start: 2021-03-11 | End: 2021-09-10

## 2021-03-13 ENCOUNTER — TELEPHONE (OUTPATIENT)
Dept: FAMILY MEDICINE CLINIC | Facility: CLINIC | Age: 86
End: 2021-03-13

## 2021-03-13 DIAGNOSIS — E11.65 TYPE 2 DIABETES MELLITUS WITH HYPERGLYCEMIA, WITHOUT LONG-TERM CURRENT USE OF INSULIN (HCC): Primary | ICD-10-CM

## 2021-03-15 NOTE — TELEPHONE ENCOUNTER
Hemoglobin 11.2, platelet count 608, TSH 1.35, sodium 137, creatinine 1.59, GFR 44, glucose 359    Appears creatinine is 1.85 last time so this is improved.   Hemoglobin is stable at 11.2, we will repeat in 3 months, letter is sent to [facility]

## 2021-03-23 ENCOUNTER — TELEPHONE (OUTPATIENT)
Dept: FAMILY MEDICINE CLINIC | Facility: CLINIC | Age: 86
End: 2021-03-23

## 2021-03-24 ENCOUNTER — TELEPHONE (OUTPATIENT)
Dept: FAMILY MEDICINE CLINIC | Facility: CLINIC | Age: 86
End: 2021-03-24

## 2021-03-24 NOTE — TELEPHONE ENCOUNTER
Received fax from [address] [place] [address] requesting Physician certification Assessment form be completed and faxed back.  Fax in triage

## 2021-03-24 NOTE — TELEPHONE ENCOUNTER
This is a full physical form and we have not done a annual wellness visit since 2019. We could do an annual wellness visit, we could technically do it as a video visit or in person but I can fill it out at that time.     He was just in a month and a half a

## 2021-03-29 PROBLEM — T22.052A: Status: RESOLVED | Noted: 2018-07-13 | Resolved: 2021-03-29

## 2021-03-30 NOTE — ASSESSMENT & PLAN NOTE
Stable continue present management .   Lab Results   Component Value Date/Time    CREATSERUM 1.85 11/27/2020 12:00 AM    GFR 37 11/27/2020 12:00 AM    GFRNAA 30 (L) 12/21/2019 12:27 PM

## 2021-03-30 NOTE — ASSESSMENT & PLAN NOTE
As for his Diabetes, it is no significant medication side effects noted, borderline controlled. hyperglyceima  Hyperglycemia present.   Recommendations are: continue present meds, lose weight by increased dietary compliance and exercise and will check labs

## 2021-03-31 ENCOUNTER — OFFICE VISIT (OUTPATIENT)
Dept: FAMILY MEDICINE CLINIC | Facility: CLINIC | Age: 86
End: 2021-03-31
Payer: MEDICARE

## 2021-03-31 VITALS
RESPIRATION RATE: 17 BRPM | HEIGHT: 73 IN | BODY MASS INDEX: 23.72 KG/M2 | HEART RATE: 79 BPM | DIASTOLIC BLOOD PRESSURE: 68 MMHG | SYSTOLIC BLOOD PRESSURE: 122 MMHG | WEIGHT: 179 LBS | TEMPERATURE: 97 F

## 2021-03-31 DIAGNOSIS — E78.2 MIXED HYPERLIPIDEMIA: ICD-10-CM

## 2021-03-31 DIAGNOSIS — Z00.00 ANNUAL PHYSICAL EXAM: ICD-10-CM

## 2021-03-31 DIAGNOSIS — E11.40 TYPE 2 DIABETES MELLITUS WITH DIABETIC NEUROPATHY, WITHOUT LONG-TERM CURRENT USE OF INSULIN (HCC): Primary | Chronic | ICD-10-CM

## 2021-03-31 DIAGNOSIS — E11.65 TYPE 2 DIABETES MELLITUS WITH HYPERGLYCEMIA, WITHOUT LONG-TERM CURRENT USE OF INSULIN (HCC): ICD-10-CM

## 2021-03-31 DIAGNOSIS — Z00.00 ENCOUNTER FOR ANNUAL HEALTH EXAMINATION: ICD-10-CM

## 2021-03-31 DIAGNOSIS — I70.0 ATHEROSCLEROSIS OF AORTA (HCC): ICD-10-CM

## 2021-03-31 DIAGNOSIS — F03.90 MILD DEMENTIA (HCC): ICD-10-CM

## 2021-03-31 DIAGNOSIS — N18.31 STAGE 3A CHRONIC KIDNEY DISEASE (HCC): ICD-10-CM

## 2021-03-31 PROBLEM — F03.A0 MILD DEMENTIA: Status: ACTIVE | Noted: 2021-03-31

## 2021-03-31 PROBLEM — F03.A0 MILD DEMENTIA (HCC): Status: ACTIVE | Noted: 2021-03-31

## 2021-03-31 LAB
CARTRIDGE LOT#: 775 NUMERIC
HEMOGLOBIN A1C: 9.8 % (ref 4.3–5.6)

## 2021-03-31 PROCEDURE — 99214 OFFICE O/P EST MOD 30 MIN: CPT | Performed by: FAMILY MEDICINE

## 2021-03-31 PROCEDURE — G0439 PPPS, SUBSEQ VISIT: HCPCS | Performed by: FAMILY MEDICINE

## 2021-03-31 PROCEDURE — 83036 HEMOGLOBIN GLYCOSYLATED A1C: CPT | Performed by: FAMILY MEDICINE

## 2021-03-31 RX ORDER — PIOGLITAZONEHYDROCHLORIDE 30 MG/1
30 TABLET ORAL DAILY
Qty: 90 TABLET | Refills: 1 | Status: SHIPPED | OUTPATIENT
Start: 2021-03-31 | End: 2021-08-11

## 2021-03-31 NOTE — PROGRESS NOTES
HPI:   Anand Delaney is a 80year old male who presents for a Medicare Subsequent Annual Wellness visit (Pt already had Initial Annual Wellness).     Doing well overall, no new issues  His last annual assessment has been over 1 year: Annual Physical due screening of functional status. Difficulty walking?: Yes   He has problems with Memory based on screening of functional status.    Memory Problems?: Yes       Depression Screening (PHQ-2/PHQ-9): Over the LAST 2 WEEKS   Little interest or pleasure in doing labs)   Lab Results   Component Value Date    WBC 6.3 11/27/2020    HGB 11.1 11/27/2020     11/27/2020        ALLERGIES:   He has No Known Allergies.     CURRENT MEDICATIONS:   Pioglitazone HCl 30 MG Oral Tab, Take 1 tablet (30 mg total) by mouth ben (9/24/2013); fluoroscopic guidance needle placement (9/24/2013); patient withough preoperative order for iv antibiotic surgical site infection prophylaxis.  (9/24/2013); patient documented not to have experienced any of the following events (9/24/2013); inj smokeless tobacco. He reports current alcohol use of about 7.0 - 14.0 standard drinks of alcohol per week. He reports that he does not use drugs.      REVIEW OF SYSTEMS:   Review of Systems     EXAM:   /68   Pulse 79   Temp 97.2 °F (36.2 °C) (Temporal of aorta ([facility] [address]  -     OFFICE/OUTPT VISIT,EST,LEVL IV    Mixed hyperlipidemia  -     OFFICE/OUTPT VISIT,EST,LEVL IV    Type 2 diabetes mellitus with hyperglycemia, without long-term current use of insulin (HCC)  -     Pioglitazone HCl 30 MG Oral Tab;  Take 1 dietary compliance and exercise and will check labs as ordered.     Lab Results   Component Value Date    A1C 9.8 (A) 03/31/2021    A1C 9.0 02/02/2021      Blood Sugar Medications          Pioglitazone HCl 15 MG Oral Tab    metFORMIN HCl 850 MG Oral Tab Cardiovascular Disease Screening     LDL Annually LDL Cholesterol (mg/dL)   Date Value   01/13/2016 70        EKG - w/ Initial Preventative Physical Exam only, or if medically necessary Electrocardiogram date06/19/2019    Colorectal Cancer Screening prescription benefits      SPECIFIC DISEASE MONITORING Internal Lab or Procedure External Lab or Procedure      Annual Monitoring of Persistent     Medications (ACE/ARB, digoxin diuretics, anticonvulsants.)    Potassium  Annually Potassium (mmol/L)   Date

## 2021-04-01 NOTE — PATIENT INSTRUCTIONS
Ziyad Bueno's SCREENING SCHEDULE   Tests on this list are recommended by your physician but may not be covered, or covered at this frequency, by your insurer. Please check with your insurance carrier before scheduling to verify coverage.     PREVENT every 10 years- more often if abnormal There are no preventive care reminders to display for this patient. Update Health Maintenance if applicable    Flex Sigmoidoscopy Screen  Covered every 5 years No results found for this or any previous visit.  No flows TDaP Not covered by Medicare Part B) No orders found for this or any previous visit.  This may be covered with your prescription benefits, but Medicare does not cover unless Medically needed    Zoster (Not covered by Medicare Part B) No orders found for [facility]

## 2021-04-05 ENCOUNTER — TELEPHONE (OUTPATIENT)
Dept: FAMILY MEDICINE CLINIC | Facility: CLINIC | Age: 86
End: 2021-04-05

## 2021-04-05 NOTE — TELEPHONE ENCOUNTER
[facility] sending a form to be filled out for his long term health Coverage. Form is in triage.

## 2021-04-06 ENCOUNTER — TELEPHONE (OUTPATIENT)
Dept: FAMILY MEDICINE CLINIC | Facility: CLINIC | Age: 86
End: 2021-04-06

## 2021-04-06 NOTE — TELEPHONE ENCOUNTER
Pt's daughter needs to know if he needs to come in to be evaluated or to answer questions on the form. If it's to answer any questions, daughter needs to be presentOsvaldo Kailey.

## 2021-04-06 NOTE — TELEPHONE ENCOUNTER
[place] for [place] and review Med list for patient. Fax back when completed to [phone].   Paperwork in triage

## 2021-04-06 NOTE — TELEPHONE ENCOUNTER
Form that needs to be completed is a cognitive assessment. I thought that you would probably need to see pt to complete the form. Please advise. Form placed in your inbox. Routed to Dr Kervin Mendoza.

## 2021-04-07 ENCOUNTER — TELEPHONE (OUTPATIENT)
Dept: FAMILY MEDICINE CLINIC | Facility: CLINIC | Age: 86
End: 2021-04-07

## 2021-04-07 NOTE — TELEPHONE ENCOUNTER
Unable to fill most of it. Questions: Does he need intermittent supervision or 24-hour supervision? Celestino Angles Also is he continue to drive or is he no longer driving.  Thanks and stay well, Cynthia Granda MD

## 2021-04-07 NOTE — TELEPHONE ENCOUNTER
Pt's daughter states she received forms from ins company and they are asking pt to see Dr. Allison Roque. She is not sure if she needs to schedule a px or an exam for a cognitive test. He was recently seen for for an annual exam. Will tht suffice?  Daughter would li

## 2021-04-08 NOTE — TELEPHONE ENCOUNTER
LMOM for daughter Anne Maurer to return call to triage to answer some questions about Simmie Coma for a form that needs to be completed. Please see Reginald Moreno previous note.

## 2021-04-09 NOTE — TELEPHONE ENCOUNTER
Spoke with Daughter Felipa Lara. Jessa Najera is now living at [facility] in [place]. He has his own apartment with supervision. Not in independent living. Felipa Lara said he needs someone to help him remember to take his meds.  He is aware of where he is living and s

## 2021-04-09 NOTE — TELEPHONE ENCOUNTER
Shaylee Tolentino is calling on behalf of [employer] to see where the Cognitive form is? She would like to speak with a nurse if possible.

## 2021-04-13 ENCOUNTER — TELEPHONE (OUTPATIENT)
Dept: FAMILY MEDICINE CLINIC | Facility: CLINIC | Age: 86
End: 2021-04-13

## 2021-04-13 NOTE — TELEPHONE ENCOUNTER
Called and talked to daughter she wanted to know if the forms got completed by Dr Rehan Watson and were they FAXed back as requested.

## 2021-04-13 NOTE — TELEPHONE ENCOUNTER
This was in my fax back bin last week. Any info on it can be forwarded to family.  Thanks and stay well, Eric Robison MD

## 2021-04-14 NOTE — TELEPHONE ENCOUNTER
LM to advised daughter paperwork was faxed over today 04/14/21 @ 9:31am and to see if she needed a copy. Copied and sent to scan.

## 2021-06-25 ENCOUNTER — TELEPHONE (OUTPATIENT)
Dept: FAMILY MEDICINE CLINIC | Facility: CLINIC | Age: 86
End: 2021-06-25

## 2021-06-25 NOTE — TELEPHONE ENCOUNTER
Called and told them Dr Donavan Severe wanted to repeat the creatinine level in 2 months
Delfina called from [address] [place] [address] requesting to speak to Dr. Trenton Knutson or a nurse. She wants to see if there are any new orders.
no

## 2021-06-28 ENCOUNTER — TELEPHONE (OUTPATIENT)
Dept: FAMILY MEDICINE CLINIC | Facility: CLINIC | Age: 86
End: 2021-06-28

## 2021-06-28 DIAGNOSIS — R39.9 UTI SYMPTOMS: Primary | ICD-10-CM

## 2021-06-28 NOTE — TELEPHONE ENCOUNTER
Homar Gary is calling from [facility] patient is  Really tired and weak, she said blood sugars are good and may have a possible UTI. Can Dr. Rehan Watson give an order for UA and cultures.  Latest vitals /61 pulse 75, respiration 18 temp 98 blood sugar after b

## 2021-06-29 NOTE — ASSESSMENT & PLAN NOTE
Stable, continue present management   Lab Results   Component Value Date/Time    CREATSERUM 1.85 11/27/2020 12:00 AM    GFR 37 11/27/2020 12:00 AM    GFRNAA 30 (L) 12/21/2019 12:27 PM

## 2021-06-30 ENCOUNTER — OFFICE VISIT (OUTPATIENT)
Dept: FAMILY MEDICINE CLINIC | Facility: CLINIC | Age: 86
End: 2021-06-30
Payer: MEDICARE

## 2021-06-30 VITALS
HEART RATE: 60 BPM | SYSTOLIC BLOOD PRESSURE: 120 MMHG | TEMPERATURE: 98 F | BODY MASS INDEX: 23.72 KG/M2 | RESPIRATION RATE: 16 BRPM | WEIGHT: 179 LBS | HEIGHT: 73 IN | DIASTOLIC BLOOD PRESSURE: 50 MMHG

## 2021-06-30 DIAGNOSIS — N18.31 STAGE 3A CHRONIC KIDNEY DISEASE (HCC): ICD-10-CM

## 2021-06-30 DIAGNOSIS — E11.40 TYPE 2 DIABETES MELLITUS WITH DIABETIC NEUROPATHY, WITHOUT LONG-TERM CURRENT USE OF INSULIN (HCC): Primary | Chronic | ICD-10-CM

## 2021-06-30 DIAGNOSIS — E11.65 TYPE 2 DIABETES MELLITUS WITH HYPERGLYCEMIA, WITHOUT LONG-TERM CURRENT USE OF INSULIN (HCC): ICD-10-CM

## 2021-06-30 DIAGNOSIS — E78.2 MIXED HYPERLIPIDEMIA: ICD-10-CM

## 2021-06-30 DIAGNOSIS — F03.90 MILD DEMENTIA (HCC): ICD-10-CM

## 2021-06-30 PROCEDURE — 99214 OFFICE O/P EST MOD 30 MIN: CPT | Performed by: FAMILY MEDICINE

## 2021-06-30 NOTE — PROGRESS NOTES
Bernie Francis is a 80year old male coming in for had concerns including Diabetes. HPI/Subjective:   HPI doing well. sabl GI . Last A1c value was 7.8% done 6/15/2021. Last Sunday lethargic.  And beack to normal.     ROS: GENERAL HEALTH: feels well otherwise Plan:  Stable, continue present management   2.  Type 2 diabetes mellitus with hyperglycemia, without long-term current use of insulin ([facility] [address]  Overview:  Seen at [facility] for Care of Diabetes, Dx prior to 2010, metformin 850, glipizide 5 and pioglitazone 15

## 2021-07-14 ENCOUNTER — TELEPHONE (OUTPATIENT)
Dept: FAMILY MEDICINE CLINIC | Facility: CLINIC | Age: 86
End: 2021-07-14

## 2021-08-11 DIAGNOSIS — E11.65 TYPE 2 DIABETES MELLITUS WITH HYPERGLYCEMIA, WITHOUT LONG-TERM CURRENT USE OF INSULIN (HCC): ICD-10-CM

## 2021-08-11 RX ORDER — PIOGLITAZONEHYDROCHLORIDE 30 MG/1
30 TABLET ORAL DAILY
Qty: 90 TABLET | Refills: 1 | Status: SHIPPED | OUTPATIENT
Start: 2021-08-11 | End: 2021-09-10

## 2021-08-27 ENCOUNTER — PATIENT MESSAGE (OUTPATIENT)
Dept: FAMILY MEDICINE CLINIC | Facility: CLINIC | Age: 86
End: 2021-08-27

## 2021-08-27 NOTE — TELEPHONE ENCOUNTER
Spoke with Priscilla Miller at [facility] in [place]. Joe's labs were drawn 8/25/2021 and a message was received from [employer] [employer] [address] on that same day to repeat labs in one month. LMOM of Joe's daughter Mary Sykes letting her know that the results were received.

## 2021-08-27 NOTE — TELEPHONE ENCOUNTER
From: Sigifredo Lewis  To: Gisella Todd MD  Sent: 8/27/2021 9:44 AM CDT  Subject: Test Results Question    Good Morning Dr. Zenovia Cooks -    My dad was scheduled to have the most recent requested blood work done this past Wednesday, the 25th.  Have you received

## 2021-09-10 DIAGNOSIS — E11.65 TYPE 2 DIABETES MELLITUS WITH HYPERGLYCEMIA, WITHOUT LONG-TERM CURRENT USE OF INSULIN (HCC): ICD-10-CM

## 2021-09-10 RX ORDER — PIOGLITAZONEHYDROCHLORIDE 30 MG/1
30 TABLET ORAL DAILY
Qty: 90 TABLET | Refills: 3 | Status: SHIPPED | OUTPATIENT
Start: 2021-09-10

## 2021-09-10 RX ORDER — SIMVASTATIN 80 MG
80 TABLET ORAL NIGHTLY
Qty: 30 TABLET | Refills: 3 | Status: SHIPPED | OUTPATIENT
Start: 2021-09-10 | End: 2021-12-03

## 2021-09-10 NOTE — TELEPHONE ENCOUNTER
Refill request for:    Requested Prescriptions     Pending Prescriptions Disp Refills   • Pioglitazone HCl 30 MG Oral Tab 90 tablet 1     Sig: Take 1 tablet (30 mg total) by mouth daily.    • simvastatin 80 MG Oral Tab 30 tablet 0     Sig: Take 1 tablet (

## 2021-09-16 NOTE — PROGRESS NOTES
Attempted to contact pt no answer left detailed message for pt to call back.    Total time spent with patient including chart review: 2  Time spent with patient this month: 7    Total time spent with communication and chart review this month to date: 7
Labs/EKG/Imaging Studies/Medications

## 2021-09-27 ENCOUNTER — TELEPHONE (OUTPATIENT)
Dept: FAMILY MEDICINE CLINIC | Facility: CLINIC | Age: 86
End: 2021-09-27

## 2021-09-27 NOTE — TELEPHONE ENCOUNTER
Labs received, hemoglobin 11.7, platelet 848, sodium 137, creatinine 1.78. GFR was 40.   Repeat 1 month

## 2021-09-28 ENCOUNTER — PATIENT MESSAGE (OUTPATIENT)
Dept: FAMILY MEDICINE CLINIC | Facility: CLINIC | Age: 86
End: 2021-09-28

## 2021-09-28 NOTE — TELEPHONE ENCOUNTER
From: Destinee Seen  To: Yassine Patel MD  Sent: 9/28/2021 11:04 AM CDT  Subject: Flu Shot    Good morning. Oneal Claude Oneal Claude Oneal Claude For my dad's records. Today, Sept 28 he received his flu shot.     Thanks,  Luis Miguel Florentino

## 2021-10-27 ENCOUNTER — OFFICE VISIT (OUTPATIENT)
Dept: FAMILY MEDICINE CLINIC | Facility: CLINIC | Age: 86
End: 2021-10-27
Payer: MEDICARE

## 2021-10-27 VITALS
HEIGHT: 72 IN | HEART RATE: 74 BPM | DIASTOLIC BLOOD PRESSURE: 60 MMHG | SYSTOLIC BLOOD PRESSURE: 126 MMHG | RESPIRATION RATE: 16 BRPM | WEIGHT: 187.38 LBS | BODY MASS INDEX: 25.38 KG/M2

## 2021-10-27 DIAGNOSIS — E11.40 TYPE 2 DIABETES MELLITUS WITH DIABETIC NEUROPATHY, WITHOUT LONG-TERM CURRENT USE OF INSULIN (HCC): Chronic | ICD-10-CM

## 2021-10-27 DIAGNOSIS — F03.90 MILD DEMENTIA (HCC): ICD-10-CM

## 2021-10-27 DIAGNOSIS — E11.65 TYPE 2 DIABETES MELLITUS WITH HYPERGLYCEMIA, WITHOUT LONG-TERM CURRENT USE OF INSULIN (HCC): Primary | ICD-10-CM

## 2021-10-27 DIAGNOSIS — E78.2 MIXED HYPERLIPIDEMIA: ICD-10-CM

## 2021-10-27 PROCEDURE — 99214 OFFICE O/P EST MOD 30 MIN: CPT | Performed by: FAMILY MEDICINE

## 2021-10-27 PROCEDURE — 83036 HEMOGLOBIN GLYCOSYLATED A1C: CPT | Performed by: FAMILY MEDICINE

## 2021-10-27 NOTE — PROGRESS NOTES
had concerns including Diabetes (follow up ). Jhoan Campbell is a 80year old male who presents for recheck of his diabetes. Subjective: In assisted living, DTR's here today and he is doing well, stable function.  Good Diabetes Mellitus control    L Normal heart sounds. No murmur heard. Pulmonary:      Effort: Pulmonary effort is normal. No respiratory distress. Breath sounds: Normal breath sounds. No wheezing.    Abdominal:      General: Bowel sounds are normal.      Palpations: Abdomen is s neuropathy  Assessment & Plan:  Lab Results   Component Value Date/Time    GATITOERUM 1.85 11/27/2020 12:00 AM    GFR 37 11/27/2020 12:00 AM    GFRNAA 30 (L) 12/21/2019 12:27 PM      Stable, continue present management   Orders:  -     HEMOGLOBIN A1C  3.  Alesha Frank

## 2021-11-04 ENCOUNTER — TELEPHONE (OUTPATIENT)
Dept: FAMILY MEDICINE CLINIC | Facility: CLINIC | Age: 86
End: 2021-11-04

## 2021-11-04 NOTE — TELEPHONE ENCOUNTER
Okay, they actually finally sent the right ones and not last months. 10/26/2021 labs include creatinine of 1.84, hemoglobin 11.6, platelets 128 and normal liver function tests.     Note signed and sent to fax back to repeat CBC and CMP in about 6 weeks

## 2021-12-03 RX ORDER — SIMVASTATIN 80 MG
80 TABLET ORAL NIGHTLY
Qty: 30 TABLET | Refills: 3 | Status: SHIPPED | OUTPATIENT
Start: 2021-12-03 | End: 2022-02-01

## 2021-12-03 NOTE — TELEPHONE ENCOUNTER
Requested Prescriptions     Pending Prescriptions Disp Refills   • simvastatin 80 MG Oral Tab 30 tablet 3     Sig: Take 1 tablet (80 mg total) by mouth nightly.      LOV 10/27/2021     Patient was asked to follow-up in: 4 months    Appointment scheduled: 3/

## 2021-12-07 ENCOUNTER — TELEPHONE (OUTPATIENT)
Dept: FAMILY MEDICINE CLINIC | Facility: CLINIC | Age: 86
End: 2021-12-07

## 2021-12-07 NOTE — TELEPHONE ENCOUNTER
Received lab rest results from [address] [place] [address].  Placed in Dr Gibran Crawford

## 2022-01-14 RX ORDER — GABAPENTIN 400 MG/1
400 CAPSULE ORAL 3 TIMES DAILY
Qty: 270 CAPSULE | Refills: 1 | Status: SHIPPED | OUTPATIENT
Start: 2022-01-14

## 2022-01-14 RX ORDER — FUROSEMIDE 40 MG/1
40 TABLET ORAL DAILY
Qty: 90 TABLET | Refills: 1 | Status: SHIPPED | OUTPATIENT
Start: 2022-01-14

## 2022-01-14 RX ORDER — MELATONIN
325 2 TIMES DAILY WITH MEALS
Qty: 180 TABLET | Refills: 1 | Status: SHIPPED | OUTPATIENT
Start: 2022-01-14

## 2022-01-14 RX ORDER — CHLORAL HYDRATE 500 MG
1000 CAPSULE ORAL 4 TIMES DAILY
Qty: 120 CAPSULE | Refills: 5 | Status: SHIPPED | OUTPATIENT
Start: 2022-01-14

## 2022-01-14 NOTE — TELEPHONE ENCOUNTER
LOV 10/27/21, last labs 10/26/21  Will you authorize refill on Ferrous Sulfate, Gabapentin, Fish Oil and Furosemide?

## 2022-02-01 ENCOUNTER — TELEPHONE (OUTPATIENT)
Dept: FAMILY MEDICINE CLINIC | Facility: CLINIC | Age: 87
End: 2022-02-01

## 2022-02-01 LAB
AMB EXT CALCIUM: 9.4
AMB EXT EGFR NON-AA: 40
AMB EXT GLUCOSE: 271 MG/DL
AMB EXT HEMATOCRIT: 30.3
AMB EXT HEMOGLOBIN: 10.8
AMB EXT HGBA1C: 7.4 %
AMB EXT MCV: 90.7
AMB EXT PLATELETS: 207
AMB EXT POSTASSIUM: 4.36 MMOL/L
AMB EXT SODIUM: 137 MMOL/L
AMB EXT WBC: 5.1 X10(3)UL

## 2022-02-01 RX ORDER — SIMVASTATIN 80 MG
80 TABLET ORAL NIGHTLY
Qty: 30 TABLET | Refills: 11 | Status: SHIPPED | OUTPATIENT
Start: 2022-02-01 | End: 2022-02-02

## 2022-02-01 NOTE — TELEPHONE ENCOUNTER
Ezequiel from [facility] requesting we send medication to his phramacy for Metformin 500mg twice a day and Atorvastatin 40 mg at bedtime

## 2022-02-02 ENCOUNTER — TELEPHONE (OUTPATIENT)
Dept: FAMILY MEDICINE CLINIC | Facility: CLINIC | Age: 87
End: 2022-02-02

## 2022-02-02 RX ORDER — ATORVASTATIN CALCIUM 40 MG/1
40 TABLET, FILM COATED ORAL NIGHTLY
Qty: 30 TABLET | Refills: 11 | Status: SHIPPED | OUTPATIENT
Start: 2022-02-02

## 2022-02-02 NOTE — TELEPHONE ENCOUNTER
Med list updated. Medications sent. Call made to [facility] to notify. Ezequiel verbalized understanding of information given.

## 2022-02-02 NOTE — TELEPHONE ENCOUNTER
Returned call to [employer]. He had requested yesterday that metformin 500 BID and atorvasatin 40 mg be sent. Metformin 850 BID and simvastatin 80 mg were sent instead. Per Ezequiel, on 1/27 Dr. Petr Bermeo signed a form agreeing to Metformin 500 BID due to pharmacies request to decrease it due to \"estimated crcl 31/min\" and the switch to atorvastatin. I do not see that med list was updated with this. Please advise which medication and dose patient is to be taking. Will need to send new RX if different. Ezequiel would like return call.

## 2022-02-02 NOTE — TELEPHONE ENCOUNTER
Process in a standard form and so they would take care of it and that signing the form would implemented. Okay to switch to Metformin 500 twice daily and atorvastatin 40 daily and send it. I level may interfere and say they will take care of everything and then do not!

## 2022-02-03 ENCOUNTER — TELEPHONE (OUTPATIENT)
Dept: FAMILY MEDICINE CLINIC | Facility: CLINIC | Age: 87
End: 2022-02-03

## 2022-02-04 RX ORDER — POLYETHYLENE GLYCOL 3350 17 G/17G
17 POWDER, FOR SOLUTION ORAL DAILY
Qty: 90 PACKET | Refills: 3 | Status: SHIPPED | OUTPATIENT
Start: 2022-02-04

## 2022-03-14 ENCOUNTER — TELEPHONE (OUTPATIENT)
Dept: FAMILY MEDICINE CLINIC | Facility: CLINIC | Age: 87
End: 2022-03-14

## 2022-03-15 NOTE — ASSESSMENT & PLAN NOTE
As for his Diabetes, it is well controlled, no significant medication side effects noted. Recommendations are: continue present meds, lose weight by increased dietary compliance and exercise and will check labs as ordered.     Lab Results   Component Value Date    A1C 7.4 02/01/2022    A1C 6.7 (A) 10/27/2021      Blood Sugar Medications          metFORMIN 500 MG Oral Tab    Pioglitazone HCl 30 MG Oral Tab

## 2022-03-15 NOTE — ASSESSMENT & PLAN NOTE
Lab Results   Component Value Date/Time    CREATSERUM 1.91 02/01/2022 12:00 AM    GFR 42 10/26/2021 12:00 AM    GFRNAA 40 02/01/2022 12:00 AM      Stable, continue present management

## 2022-03-16 ENCOUNTER — OFFICE VISIT (OUTPATIENT)
Dept: FAMILY MEDICINE CLINIC | Facility: CLINIC | Age: 87
End: 2022-03-16
Payer: MEDICARE

## 2022-03-16 VITALS
HEART RATE: 66 BPM | WEIGHT: 187.81 LBS | HEIGHT: 72 IN | RESPIRATION RATE: 18 BRPM | SYSTOLIC BLOOD PRESSURE: 112 MMHG | DIASTOLIC BLOOD PRESSURE: 60 MMHG | BODY MASS INDEX: 25.44 KG/M2

## 2022-03-16 DIAGNOSIS — Z00.00 ENCOUNTER FOR ANNUAL HEALTH EXAMINATION: ICD-10-CM

## 2022-03-16 DIAGNOSIS — I87.2 VENOUS INSUFFICIENCY OF BOTH LOWER EXTREMITIES: ICD-10-CM

## 2022-03-16 DIAGNOSIS — I44.7 LBBB (LEFT BUNDLE BRANCH BLOCK): ICD-10-CM

## 2022-03-16 DIAGNOSIS — H25.813 COMBINED FORMS OF AGE-RELATED CATARACT, BILATERAL: ICD-10-CM

## 2022-03-16 DIAGNOSIS — N18.31 STAGE 3A CHRONIC KIDNEY DISEASE (HCC): ICD-10-CM

## 2022-03-16 DIAGNOSIS — Z00.00 ANNUAL PHYSICAL EXAM: Primary | ICD-10-CM

## 2022-03-16 DIAGNOSIS — I35.8 MILD AORTIC VALVE SCLEROSIS: ICD-10-CM

## 2022-03-16 DIAGNOSIS — E11.40 TYPE 2 DIABETES MELLITUS WITH DIABETIC NEUROPATHY, WITHOUT LONG-TERM CURRENT USE OF INSULIN (HCC): Chronic | ICD-10-CM

## 2022-03-16 DIAGNOSIS — F03.90 MILD DEMENTIA (HCC): ICD-10-CM

## 2022-03-16 DIAGNOSIS — E11.65 TYPE 2 DIABETES MELLITUS WITH HYPERGLYCEMIA, WITHOUT LONG-TERM CURRENT USE OF INSULIN (HCC): ICD-10-CM

## 2022-03-16 DIAGNOSIS — I70.0 ATHEROSCLEROSIS OF AORTA (HCC): ICD-10-CM

## 2022-03-16 DIAGNOSIS — E78.2 MIXED HYPERLIPIDEMIA: ICD-10-CM

## 2022-03-16 PROCEDURE — G0439 PPPS, SUBSEQ VISIT: HCPCS | Performed by: FAMILY MEDICINE

## 2022-03-16 PROCEDURE — 99214 OFFICE O/P EST MOD 30 MIN: CPT | Performed by: FAMILY MEDICINE

## 2022-04-04 LAB
AMB EXT BILIRUBIN, TOTAL: 0.5 MG/DL (ref 0.3–1.2)
AMB EXT BUN: 24 MG/DL (ref 7–25)
AMB EXT CALCIUM: 9.3 (ref 8.6–10.3)
AMB EXT CARBON DIOXIDE: 27 (ref 22–30)
AMB EXT CHLORIDE: 101 (ref 95–110)
AMB EXT CREATININE: 1.77 MG/DL (ref 0.8–1.3)
AMB EXT CREATININE: 1.77 MG/DL (ref 0.8–1.39)
AMB EXT GLUCOSE: 181 MG/DL (ref 70–105)
AMB EXT HEMATOCRIT: 32.7 (ref 36–50.99)
AMB EXT HEMOGLOBIN: 11 (ref 12–16.99)
AMB EXT MCV: 91.4 (ref 80–99)
AMB EXT PLATELETS: 213 (ref 140–440)
AMB EXT POSTASSIUM: 4.57 MMOL/L (ref 3.45–5.14)
AMB EXT SODIUM: 137 MMOL/L (ref 136–146)
AMB EXT TOTAL PROTEIN: 6.45 (ref 6–8.3)
AMB EXT WBC: 6.8 X10(3)UL (ref 5–10)

## 2022-04-06 ENCOUNTER — TELEPHONE (OUTPATIENT)
Dept: FAMILY MEDICINE CLINIC | Facility: CLINIC | Age: 87
End: 2022-04-06

## 2022-04-06 NOTE — TELEPHONE ENCOUNTER
Labs received, hemoglobin 11.0, creatinine 1.77, glucose 181, potassium 4.57, will let daughter know

## 2022-05-02 ENCOUNTER — OFFICE VISIT (OUTPATIENT)
Dept: FAMILY MEDICINE CLINIC | Facility: CLINIC | Age: 87
End: 2022-05-02
Payer: MEDICARE

## 2022-05-02 VITALS
HEIGHT: 72.5 IN | SYSTOLIC BLOOD PRESSURE: 114 MMHG | BODY MASS INDEX: 24.81 KG/M2 | RESPIRATION RATE: 12 BRPM | HEART RATE: 68 BPM | DIASTOLIC BLOOD PRESSURE: 58 MMHG | WEIGHT: 185.19 LBS | TEMPERATURE: 98 F

## 2022-05-02 DIAGNOSIS — R21 RASH: Primary | ICD-10-CM

## 2022-05-02 RX ORDER — LORATADINE 10 MG/1
10 TABLET ORAL DAILY
Qty: 90 TABLET | Refills: 0 | Status: SHIPPED | OUTPATIENT
Start: 2022-05-02

## 2022-05-02 RX ORDER — PREDNISONE 20 MG/1
40 TABLET ORAL DAILY
Qty: 10 TABLET | Refills: 0 | Status: SHIPPED | OUTPATIENT
Start: 2022-05-02

## 2022-05-02 NOTE — PATIENT INSTRUCTIONS
Use Curel itch Defense, Eucerin or Cerave (all available over the counter), twice daily to entire body for 10-14 days, especially after bathing. Please try not to use any other lotions.

## 2022-05-04 ENCOUNTER — TELEPHONE (OUTPATIENT)
Dept: FAMILY MEDICINE CLINIC | Facility: CLINIC | Age: 87
End: 2022-05-04

## 2022-05-04 NOTE — TELEPHONE ENCOUNTER
Labs show creatinine 1.84, cholesterol 124, white count 4.0, hemoglobin 11.2, GFR was 42, improved.   TSH was 0.06

## 2022-05-13 ENCOUNTER — TELEPHONE (OUTPATIENT)
Dept: FAMILY MEDICINE CLINIC | Facility: CLINIC | Age: 87
End: 2022-05-13

## 2022-05-13 NOTE — TELEPHONE ENCOUNTER
swapna and talked to daughter and he has been calling her saying he feels confused and tired. Just finished prednisone called [facility] and the will call us with his current BS also check him for symptoms of a UTI.  They are asking for an order discontinuing the prednisone and to get UA & Cul

## 2022-05-13 NOTE — TELEPHONE ENCOUNTER
OK fo Joel and culture for mental status changes. Ok to hold prednisone    pred 20 BID for 5 days only, he should have stopped it on 5/8!

## 2022-05-13 NOTE — TELEPHONE ENCOUNTER
Daughter calling. States pt just finished round of prednisone and he seems to be more confused and tired the last couple days. Pt has type 2 diabetes and does not know if it's related to the medication of his actual diabetes. Pt has been calling daughter stating he is confused and tired and does not know what he needs to do next.

## 2022-05-25 ENCOUNTER — TELEPHONE (OUTPATIENT)
Dept: FAMILY MEDICINE CLINIC | Facility: CLINIC | Age: 87
End: 2022-05-25

## 2022-05-25 NOTE — TELEPHONE ENCOUNTER
Nurse at [facility] is calling for an order for an ointment for the patient he is itchy all over. Just ask for the nurse at the desk  Please send a script to Jorge of [address] [facility].  Call for clarification

## 2022-05-27 ENCOUNTER — OFFICE VISIT (OUTPATIENT)
Dept: FAMILY MEDICINE CLINIC | Facility: CLINIC | Age: 87
End: 2022-05-27
Payer: MEDICARE

## 2022-05-27 VITALS
SYSTOLIC BLOOD PRESSURE: 100 MMHG | RESPIRATION RATE: 16 BRPM | OXYGEN SATURATION: 98 % | TEMPERATURE: 97 F | HEIGHT: 72.5 IN | DIASTOLIC BLOOD PRESSURE: 50 MMHG | HEART RATE: 76 BPM | BODY MASS INDEX: 24.87 KG/M2 | WEIGHT: 185.63 LBS

## 2022-05-27 DIAGNOSIS — R21 RASH: Primary | ICD-10-CM

## 2022-05-27 PROCEDURE — 99213 OFFICE O/P EST LOW 20 MIN: CPT | Performed by: NURSE PRACTITIONER

## 2022-05-27 RX ORDER — PREDNISONE 10 MG/1
TABLET ORAL
Qty: 39 TABLET | Refills: 0 | Status: SHIPPED | OUTPATIENT
Start: 2022-05-27

## 2022-06-01 RX ORDER — ATORVASTATIN CALCIUM 40 MG/1
40 TABLET, FILM COATED ORAL NIGHTLY
Qty: 30 TABLET | Refills: 11 | Status: SHIPPED | OUTPATIENT
Start: 2022-06-01

## 2022-06-07 DIAGNOSIS — E11.65 TYPE 2 DIABETES MELLITUS WITH HYPERGLYCEMIA, WITHOUT LONG-TERM CURRENT USE OF INSULIN (HCC): ICD-10-CM

## 2022-06-07 RX ORDER — LORATADINE 10 MG/1
10 TABLET ORAL DAILY
Qty: 90 TABLET | Refills: 0 | OUTPATIENT
Start: 2022-06-07

## 2022-06-07 RX ORDER — PIOGLITAZONEHYDROCHLORIDE 30 MG/1
30 TABLET ORAL DAILY
Qty: 90 TABLET | Refills: 0 | Status: SHIPPED | OUTPATIENT
Start: 2022-06-07 | End: 2022-06-10

## 2022-06-07 NOTE — TELEPHONE ENCOUNTER
Left message for daughter to call back    Faxed request states pt \"may benefit\" from switching to 90 scripts at [facility]. He is currently with local pharmacy [facility]. Await call back. Pt has enough refills at local pharmacy to last through 9/2022.      Request to Dr Brenda Mishra to see if microalbumin should be done on this pt

## 2022-06-07 NOTE — TELEPHONE ENCOUNTER
1. Type 2 diabetes mellitus with hyperglycemia, without long-term current use of insulin ([facility] [address]  Overview:  Seen at [facility] for Care of Diabetes, Dx prior to 2010, metformin 850, glipizide 5 and pioglitazone 15 started 9/11/2019 and increased to 30 3/31/2021  . CKD 3 with GFR 47  Orders:  -     Pioglitazone HCl; Take 1 tablet (30 mg total) by mouth daily. Dispense: 90 tablet; Refill: 0  -     Microalb/Creat Ratio, Random Urine; Future; Expected date: 06/07/2022       OK to notify.  Thanks, Viviane Shrestha MD

## 2022-06-07 NOTE — TELEPHONE ENCOUNTER
Pt daughter returned your call. She has to check with Pt's assisted living facility to see if they will allow his meds to be through the mail order pharmacy. She will call back again once she has an answer to that question.

## 2022-06-10 NOTE — TELEPHONE ENCOUNTER
Spoke with daughter. They did not request to use [facility]. She would like to continue using [facility] as patient is in assisted living and she manages his medications. Refill also not needed until September. Cancelled now at [facility]. Patient's daughter notified. Patient's daughter verbalized understanding of information given.

## 2022-07-05 LAB
AMB EXT BILIRUBIN, TOTAL: 0.5 MG/DL (ref 0.3–1.2)
AMB EXT BUN: 27 MG/DL (ref 7–25)
AMB EXT CALCIUM: 9.6 (ref 8.6–10.3)
AMB EXT CARBON DIOXIDE: 27 (ref 22–30)
AMB EXT CHLORIDE: 101 (ref 95–110)
AMB EXT CREATININE: 1.83 MG/DL (ref 0.8–1.39)
AMB EXT GLUCOSE: 202 MG/DL (ref 70–105)
AMB EXT GLUCOSE: 202 MG/DL (ref 70–105)
AMB EXT HEMATOCRIT: 32.2 (ref 38–50.99)
AMB EXT HEMOGLOBIN: 10.8 (ref 12–16.99)
AMB EXT MCV: 91.1 (ref 80–99)
AMB EXT PLATELETS: 221 (ref 140–440)
AMB EXT POSTASSIUM: 4.31 MMOL/L (ref 3.45–5.14)
AMB EXT SODIUM: 138 MMOL/L (ref 136–145)
AMB EXT TOTAL PROTEIN: 6.32 (ref 6–8.3)
AMB EXT WBC: 6.2 X10(3)UL (ref 5–10)

## 2022-07-06 ENCOUNTER — TELEPHONE (OUTPATIENT)
Dept: FAMILY MEDICINE CLINIC | Facility: CLINIC | Age: 87
End: 2022-07-06

## 2022-07-06 NOTE — TELEPHONE ENCOUNTER
Labs show mild anemia- Hgb- 10.8, hematocrit- 32.2. GFR 36. No other concerning abnormalities. Labs to be further addressed during upcoming visit with Dr. Martha Garrison. Labs sent to be abstracted and scanned.

## 2022-07-06 NOTE — TELEPHONE ENCOUNTER
Received lab results from [address] [place] [address]. Paperwork in [address] [place] [address] in box for review.

## 2022-07-14 PROBLEM — I48.4 ATYPICAL ATRIAL FLUTTER (HCC): Status: ACTIVE | Noted: 2022-07-14

## 2022-07-14 RX ORDER — ASPIRIN 81 MG/1
81 TABLET ORAL DAILY
COMMUNITY
Start: 2022-06-05

## 2022-07-14 RX ORDER — PIOGLITAZONEHYDROCHLORIDE 30 MG/1
TABLET ORAL
COMMUNITY
Start: 2022-07-04 | End: 2022-07-15

## 2022-07-14 RX ORDER — METOPROLOL SUCCINATE 25 MG/1
25 TABLET, EXTENDED RELEASE ORAL DAILY
COMMUNITY
Start: 2022-06-05

## 2022-07-15 ENCOUNTER — OFFICE VISIT (OUTPATIENT)
Dept: FAMILY MEDICINE CLINIC | Facility: CLINIC | Age: 87
End: 2022-07-15
Payer: MEDICARE

## 2022-07-15 VITALS
HEART RATE: 68 BPM | HEIGHT: 72.5 IN | BODY MASS INDEX: 24.97 KG/M2 | SYSTOLIC BLOOD PRESSURE: 110 MMHG | DIASTOLIC BLOOD PRESSURE: 60 MMHG | WEIGHT: 186.38 LBS | RESPIRATION RATE: 18 BRPM

## 2022-07-15 DIAGNOSIS — F41.9 ANXIETY: ICD-10-CM

## 2022-07-15 DIAGNOSIS — I35.8 MILD AORTIC VALVE SCLEROSIS: ICD-10-CM

## 2022-07-15 DIAGNOSIS — D68.69 SECONDARY HYPERCOAGULABLE STATE (HCC): ICD-10-CM

## 2022-07-15 DIAGNOSIS — E11.40 TYPE 2 DIABETES MELLITUS WITH DIABETIC NEUROPATHY, WITHOUT LONG-TERM CURRENT USE OF INSULIN (HCC): Primary | Chronic | ICD-10-CM

## 2022-07-15 DIAGNOSIS — E11.65 TYPE 2 DIABETES MELLITUS WITH HYPERGLYCEMIA, WITHOUT LONG-TERM CURRENT USE OF INSULIN (HCC): ICD-10-CM

## 2022-07-15 DIAGNOSIS — N18.31 STAGE 3A CHRONIC KIDNEY DISEASE (HCC): ICD-10-CM

## 2022-07-15 DIAGNOSIS — F03.90 MILD DEMENTIA (HCC): ICD-10-CM

## 2022-07-15 DIAGNOSIS — E78.2 MIXED HYPERLIPIDEMIA: ICD-10-CM

## 2022-07-15 DIAGNOSIS — I48.4 ATYPICAL ATRIAL FLUTTER (HCC): ICD-10-CM

## 2022-07-15 PROBLEM — I50.21 ACUTE HFREF (HEART FAILURE WITH REDUCED EJECTION FRACTION) (HCC): Status: ACTIVE | Noted: 2022-06-17

## 2022-07-15 LAB
CARTRIDGE LOT#: 928 NUMERIC
HEMOGLOBIN A1C: 8.5 % (ref 4.3–5.6)

## 2022-07-15 RX ORDER — PIOGLITAZONEHYDROCHLORIDE 30 MG/1
30 TABLET ORAL DAILY
Qty: 90 TABLET | Refills: 3 | Status: SHIPPED | OUTPATIENT
Start: 2022-07-15

## 2022-07-15 NOTE — ASSESSMENT & PLAN NOTE
Stable, Continue present management.     Blood Pressure and Cardiac Medications          metoprolol succinate ER 25 MG Oral Tablet 24 Hr

## 2022-07-15 NOTE — ASSESSMENT & PLAN NOTE
Lab Results   Component Value Date/Time    CREATSEVAN 1.83 (A) 07/05/2022 12:00 AM    GFR 42 10/26/2021 12:00 AM    GFRNAA 40 02/01/2022 12:00 AM      Stable, continue present management

## 2022-07-15 NOTE — ASSESSMENT & PLAN NOTE
As for his Diabetes, it is well controlled, no significant medication side effects noted. Recommendations are: continue present meds, lose weight by increased dietary compliance and exercise and will check labs as ordered.     Lab Results   Component Value Date    A1C 7.4 02/01/2022    A1C 6.7 (A) 10/27/2021      Blood Sugar Medications          pioglitazone 30 MG Oral Tab    metFORMIN 500 MG Oral Tab

## 2022-09-13 ENCOUNTER — TELEPHONE (OUTPATIENT)
Dept: FAMILY MEDICINE CLINIC | Facility: CLINIC | Age: 87
End: 2022-09-13

## 2022-09-13 DIAGNOSIS — E11.40 TYPE 2 DIABETES MELLITUS WITH DIABETIC NEUROPATHY, WITHOUT LONG-TERM CURRENT USE OF INSULIN (HCC): Chronic | ICD-10-CM

## 2022-09-13 NOTE — TELEPHONE ENCOUNTER
Called and talked to Suzie they never got an order for metformin so have not been giving it to him. They want to know if Dr Chela Zavala wants them to start this before his appointment 9/16/22 at 13:45 If he does they need a new order faxed to them at [phone].  If not we should call them to say hold until appointment

## 2022-09-13 NOTE — TELEPHONE ENCOUNTER
Received fax from [facility] requesting  Physician certification Assessment form be completed and faxed back to [phone].  Form in triage

## 2022-09-13 NOTE — TELEPHONE ENCOUNTER
Jessica Jensen from [address] [place] [address] requesting a call back on medication Metformin

## 2022-09-14 NOTE — TELEPHONE ENCOUNTER
He has been on it since May at 500 BID< was 850 prior to that. Have they not given it?     OK to send order

## 2022-09-14 NOTE — TELEPHONE ENCOUNTER
And talked to Suzie she asked if we could send new order to [facility] so I did send this.

## 2022-09-15 PROBLEM — I50.22 CHRONIC HFREF (HEART FAILURE WITH REDUCED EJECTION FRACTION) (HCC): Status: ACTIVE | Noted: 2022-06-17

## 2022-09-16 ENCOUNTER — PATIENT MESSAGE (OUTPATIENT)
Dept: FAMILY MEDICINE CLINIC | Facility: CLINIC | Age: 87
End: 2022-09-16

## 2022-09-16 DIAGNOSIS — E11.65 TYPE 2 DIABETES MELLITUS WITH HYPERGLYCEMIA, WITHOUT LONG-TERM CURRENT USE OF INSULIN (HCC): Primary | ICD-10-CM

## 2022-09-16 DIAGNOSIS — N18.31 STAGE 3A CHRONIC KIDNEY DISEASE (HCC): ICD-10-CM

## 2022-09-16 DIAGNOSIS — Z13.0 SCREENING, ANEMIA, DEFICIENCY, IRON: ICD-10-CM

## 2022-09-16 DIAGNOSIS — E78.2 MIXED HYPERLIPIDEMIA: ICD-10-CM

## 2022-09-16 NOTE — TELEPHONE ENCOUNTER
From: Patrick Bal  To: Angelina Buckner MD  Sent: 9/16/2022 11:17 AM CDT  Subject: Lab work    Hi Dr Cameron Mckeon -  With today's appointment getting changed to mid-October, I was just wondering if you wanted any labs done prior to that appointment. His last bloodwork was July 5th.      Thank you,  Seamus Bernardo

## 2022-09-16 NOTE — TELEPHONE ENCOUNTER
[id number] Radha Padilla for CMP, CBC and A1c at his assisted living location.  Thanks and stay well, Marina Vitale MD

## 2022-09-27 ENCOUNTER — TELEPHONE (OUTPATIENT)
Dept: FAMILY MEDICINE CLINIC | Facility: CLINIC | Age: 87
End: 2022-09-27

## 2022-09-27 NOTE — TELEPHONE ENCOUNTER
Ezequiel from [facility] assisted living called to request we fax lab orders for Pt to complete prior to upcoming [address] [facility]. Printed and faxed orders to fax [phone].

## 2022-10-13 LAB
AMB EXT EGFR NON-AA: 34
AMB EXT HGBA1C: 7.5 %

## 2022-10-14 ENCOUNTER — TELEPHONE (OUTPATIENT)
Dept: FAMILY MEDICINE CLINIC | Facility: CLINIC | Age: 87
End: 2022-10-14

## 2022-10-14 NOTE — TELEPHONE ENCOUNTER
Labs from [address]  Creatinine 1.94, potassium 4.3, glucose 88, A1c 7.5%    Labs, orderbird AGt message sent, repeat in 1 month with a CBC

## 2022-10-18 NOTE — ASSESSMENT & PLAN NOTE
Lab Results   Component Value Date/Time    CREATSEVAN 1.83 (A) 07/05/2022 12:00 AM    GFR 42 10/26/2021 12:00 AM    GFRNAA 34 10/13/2022 12:00 AM      Stable, continue present management

## 2022-10-18 NOTE — ASSESSMENT & PLAN NOTE
As for his Diabetes, it is well controlled, no significant medication side effects noted. hyperglycemai      Recommendations are: continue present meds, lose weight by increased dietary compliance and exercise and will check labs as ordered.     Lab Results   Component Value Date    A1C 7.5 10/13/2022    A1C 8.5 (A) 07/15/2022      Blood Sugar Medications          metFORMIN 500 MG Oral Tab    pioglitazone 30 MG Oral Tab

## 2022-10-19 ENCOUNTER — OFFICE VISIT (OUTPATIENT)
Dept: FAMILY MEDICINE CLINIC | Facility: CLINIC | Age: 87
End: 2022-10-19
Payer: MEDICARE

## 2022-10-19 VITALS
HEART RATE: 74 BPM | BODY MASS INDEX: 25.76 KG/M2 | DIASTOLIC BLOOD PRESSURE: 60 MMHG | HEIGHT: 72 IN | WEIGHT: 190.19 LBS | RESPIRATION RATE: 18 BRPM | SYSTOLIC BLOOD PRESSURE: 110 MMHG

## 2022-10-19 DIAGNOSIS — N18.32 STAGE 3B CHRONIC KIDNEY DISEASE (HCC): ICD-10-CM

## 2022-10-19 DIAGNOSIS — I48.4 ATYPICAL ATRIAL FLUTTER (HCC): ICD-10-CM

## 2022-10-19 DIAGNOSIS — D68.69 SECONDARY HYPERCOAGULABLE STATE (HCC): ICD-10-CM

## 2022-10-19 DIAGNOSIS — F02.A4 MILD LATE ONSET ALZHEIMER'S DEMENTIA WITH ANXIETY (HCC): ICD-10-CM

## 2022-10-19 DIAGNOSIS — I50.22 CHRONIC HFREF (HEART FAILURE WITH REDUCED EJECTION FRACTION) (HCC): ICD-10-CM

## 2022-10-19 DIAGNOSIS — R21 RASH: ICD-10-CM

## 2022-10-19 DIAGNOSIS — E11.65 TYPE 2 DIABETES MELLITUS WITH HYPERGLYCEMIA, WITHOUT LONG-TERM CURRENT USE OF INSULIN (HCC): Primary | ICD-10-CM

## 2022-10-19 DIAGNOSIS — E78.2 MIXED HYPERLIPIDEMIA: ICD-10-CM

## 2022-10-19 DIAGNOSIS — G30.1 MILD LATE ONSET ALZHEIMER'S DEMENTIA WITH ANXIETY (HCC): ICD-10-CM

## 2022-10-19 LAB
CREAT UR-SCNC: 54.9 MG/DL
MICROALBUMIN UR-MCNC: 1.23 MG/DL
MICROALBUMIN/CREAT 24H UR-RTO: 22.4 UG/MG (ref ?–30)

## 2022-10-19 PROCEDURE — 82043 UR ALBUMIN QUANTITATIVE: CPT | Performed by: FAMILY MEDICINE

## 2022-10-19 PROCEDURE — 82570 ASSAY OF URINE CREATININE: CPT | Performed by: FAMILY MEDICINE

## 2022-10-19 PROCEDURE — 99214 OFFICE O/P EST MOD 30 MIN: CPT | Performed by: FAMILY MEDICINE

## 2022-11-04 NOTE — ASSESSMENT & PLAN NOTE
Addended by: KASSANDRA MCADAMS on: 11/4/2022 03:07 PM     Modules accepted: Orders     Lab Results   Component Value Date/Time    CREATSERUM 1.85 11/27/2020 12:00 AM    GFR 37 11/27/2020 12:00 AM    GFRNAA 30 (L) 12/21/2019 12:27 PM      Stable, continue present management

## 2022-12-02 ENCOUNTER — TELEPHONE (OUTPATIENT)
Dept: FAMILY MEDICINE CLINIC | Facility: CLINIC | Age: 87
End: 2022-12-02

## 2022-12-02 NOTE — TELEPHONE ENCOUNTER
Stable labs, see note to DTR's    labs came back today. They were drawn November 30. Potassium 4.39, creatinine 1.85, glucose 153, hemoglobin A1c 5.8%, hemoglobin is down to 10.1 with a white count of 7.5. GFR, kidney function is 41.

## 2022-12-03 ENCOUNTER — TELEPHONE (OUTPATIENT)
Dept: FAMILY MEDICINE CLINIC | Facility: CLINIC | Age: 87
End: 2022-12-03

## 2022-12-03 NOTE — TELEPHONE ENCOUNTER
Talked to nurse at [facility] told her if he is not doing well she should send him to the hospital to be seen. She will notify the family.

## 2022-12-03 NOTE — TELEPHONE ENCOUNTER
Nurse calling from senior facility with concerns about pt. Pt was not able to sleep all night. Pt states he has not been feeling well, discomfort all over the body, no appetite, /52,  O2-90, face is red but no fever. Nurse asking to speak to clinical to determine if pt should go to ER.

## 2022-12-21 ENCOUNTER — TELEPHONE (OUTPATIENT)
Dept: FAMILY MEDICINE CLINIC | Facility: CLINIC | Age: 87
End: 2022-12-21

## 2022-12-21 DIAGNOSIS — R68.89 FLU-LIKE SYMPTOMS: Primary | ICD-10-CM

## 2022-12-21 NOTE — TELEPHONE ENCOUNTER
Radha Coto [phone] send hard copy of requested test to them They already did a rapid covid which was neg.  Order faxed to requested number

## 2023-01-04 ENCOUNTER — EXTERNAL FACILITY (OUTPATIENT)
Dept: FAMILY MEDICINE CLINIC | Facility: CLINIC | Age: 88
End: 2023-01-04

## 2023-01-04 DIAGNOSIS — R53.1 GENERALIZED WEAKNESS: Primary | ICD-10-CM

## 2023-01-04 DIAGNOSIS — J12.82 PNEUMONIA DUE TO COVID-19 VIRUS: ICD-10-CM

## 2023-01-04 DIAGNOSIS — N18.32 STAGE 3B CHRONIC KIDNEY DISEASE (HCC): ICD-10-CM

## 2023-01-04 DIAGNOSIS — F02.A4 MILD LATE ONSET ALZHEIMER'S DEMENTIA WITH ANXIETY (HCC): ICD-10-CM

## 2023-01-04 DIAGNOSIS — I50.22 CHRONIC HFREF (HEART FAILURE WITH REDUCED EJECTION FRACTION) (HCC): ICD-10-CM

## 2023-01-04 DIAGNOSIS — J15.9 SECONDARY BACTERIAL PNEUMONIA: ICD-10-CM

## 2023-01-04 DIAGNOSIS — G30.1 MILD LATE ONSET ALZHEIMER'S DEMENTIA WITH ANXIETY (HCC): ICD-10-CM

## 2023-01-04 DIAGNOSIS — U07.1 PNEUMONIA DUE TO COVID-19 VIRUS: ICD-10-CM

## 2023-01-04 PROCEDURE — 99306 1ST NF CARE HIGH MDM 50: CPT | Performed by: FAMILY MEDICINE

## 2023-01-06 ENCOUNTER — INITIAL APN SNF VISIT (OUTPATIENT)
Dept: INTERNAL MEDICINE CLINIC | Age: 88
End: 2023-01-06

## 2023-01-06 DIAGNOSIS — I50.22 CHRONIC SYSTOLIC CONGESTIVE HEART FAILURE (HCC): ICD-10-CM

## 2023-01-06 DIAGNOSIS — E78.2 MIXED HYPERLIPIDEMIA: Primary | ICD-10-CM

## 2023-01-06 DIAGNOSIS — I35.0 AORTIC VALVE STENOSIS, ETIOLOGY OF CARDIAC VALVE DISEASE UNSPECIFIED: ICD-10-CM

## 2023-01-06 DIAGNOSIS — E11.65 TYPE 2 DIABETES MELLITUS WITH HYPERGLYCEMIA, WITHOUT LONG-TERM CURRENT USE OF INSULIN (HCC): ICD-10-CM

## 2023-01-06 DIAGNOSIS — U07.1 COVID: ICD-10-CM

## 2023-01-06 DIAGNOSIS — B59 PNEUMONIA DUE TO PNEUMOCYSTIS JIROVECII, UNSPECIFIED LATERALITY, UNSPECIFIED PART OF LUNG (HCC): ICD-10-CM

## 2023-01-06 DIAGNOSIS — I44.7 LBBB (LEFT BUNDLE BRANCH BLOCK): ICD-10-CM

## 2023-01-06 DIAGNOSIS — I48.20 ATRIAL FIBRILLATION, CHRONIC (HCC): ICD-10-CM

## 2023-01-06 DIAGNOSIS — I35.8 MILD AORTIC VALVE SCLEROSIS: ICD-10-CM

## 2023-01-06 PROCEDURE — 99310 SBSQ NF CARE HIGH MDM 45: CPT | Performed by: NURSE PRACTITIONER

## 2023-01-06 PROCEDURE — 1126F AMNT PAIN NOTED NONE PRSNT: CPT | Performed by: NURSE PRACTITIONER

## 2023-01-07 ENCOUNTER — EXTERNAL FACILITY (OUTPATIENT)
Dept: FAMILY MEDICINE CLINIC | Facility: CLINIC | Age: 88
End: 2023-01-07

## 2023-01-07 VITALS
OXYGEN SATURATION: 92 % | HEART RATE: 79 BPM | RESPIRATION RATE: 18 BRPM | BODY MASS INDEX: 24 KG/M2 | WEIGHT: 174.81 LBS | DIASTOLIC BLOOD PRESSURE: 70 MMHG | SYSTOLIC BLOOD PRESSURE: 125 MMHG | TEMPERATURE: 98 F

## 2023-01-07 DIAGNOSIS — E11.65 TYPE 2 DIABETES MELLITUS WITH HYPERGLYCEMIA, WITHOUT LONG-TERM CURRENT USE OF INSULIN (HCC): ICD-10-CM

## 2023-01-07 DIAGNOSIS — G47.00 INSOMNIA, UNSPECIFIED TYPE: Primary | ICD-10-CM

## 2023-01-07 DIAGNOSIS — R53.1 GENERALIZED WEAKNESS: ICD-10-CM

## 2023-01-07 PROCEDURE — 99309 SBSQ NF CARE MODERATE MDM 30: CPT | Performed by: FAMILY MEDICINE

## 2023-01-07 RX ORDER — AMIODARONE HYDROCHLORIDE 200 MG/1
200 TABLET ORAL 2 TIMES DAILY
COMMUNITY

## 2023-01-07 RX ORDER — FUROSEMIDE 20 MG/1
20 TABLET ORAL DAILY
COMMUNITY

## 2023-01-07 RX ORDER — LIDOCAINE 50 MG/G
1 PATCH TOPICAL EVERY 24 HOURS
COMMUNITY

## 2023-01-11 ENCOUNTER — SNF VISIT (OUTPATIENT)
Dept: INTERNAL MEDICINE CLINIC | Age: 88
End: 2023-01-11

## 2023-01-11 VITALS
SYSTOLIC BLOOD PRESSURE: 108 MMHG | DIASTOLIC BLOOD PRESSURE: 48 MMHG | OXYGEN SATURATION: 96 % | TEMPERATURE: 96 F | HEART RATE: 61 BPM | RESPIRATION RATE: 18 BRPM

## 2023-01-11 DIAGNOSIS — E11.65 TYPE 2 DIABETES MELLITUS WITH HYPERGLYCEMIA, WITHOUT LONG-TERM CURRENT USE OF INSULIN (HCC): ICD-10-CM

## 2023-01-11 DIAGNOSIS — Z91.199 MEDICALLY NONCOMPLIANT: ICD-10-CM

## 2023-01-11 DIAGNOSIS — R53.83 LETHARGIC: ICD-10-CM

## 2023-01-11 DIAGNOSIS — I48.20 ATRIAL FIBRILLATION, CHRONIC (HCC): ICD-10-CM

## 2023-01-11 DIAGNOSIS — E78.2 MIXED HYPERLIPIDEMIA: Primary | ICD-10-CM

## 2023-01-11 PROCEDURE — 99309 SBSQ NF CARE MODERATE MDM 30: CPT | Performed by: NURSE PRACTITIONER

## 2023-01-12 RX ORDER — METOPROLOL TARTRATE 100 MG/1
50 TABLET ORAL 2 TIMES DAILY
COMMUNITY

## 2023-01-21 ENCOUNTER — EXTERNAL FACILITY (OUTPATIENT)
Dept: FAMILY MEDICINE CLINIC | Facility: CLINIC | Age: 88
End: 2023-01-21

## 2023-01-21 DIAGNOSIS — R53.1 GENERALIZED WEAKNESS: Primary | ICD-10-CM

## 2023-01-21 DIAGNOSIS — I50.22 CHRONIC HFREF (HEART FAILURE WITH REDUCED EJECTION FRACTION) (HCC): ICD-10-CM

## 2023-01-21 DIAGNOSIS — J12.82 PNEUMONIA DUE TO COVID-19 VIRUS: ICD-10-CM

## 2023-01-21 DIAGNOSIS — U07.1 PNEUMONIA DUE TO COVID-19 VIRUS: ICD-10-CM

## 2023-01-21 DIAGNOSIS — E11.65 TYPE 2 DIABETES MELLITUS WITH HYPERGLYCEMIA, WITHOUT LONG-TERM CURRENT USE OF INSULIN (HCC): ICD-10-CM

## 2023-01-21 PROCEDURE — 99309 SBSQ NF CARE MODERATE MDM 30: CPT | Performed by: FAMILY MEDICINE

## 2023-01-25 ENCOUNTER — SNF VISIT (OUTPATIENT)
Dept: INTERNAL MEDICINE CLINIC | Age: 88
End: 2023-01-25

## 2023-01-25 VITALS
HEART RATE: 56 BPM | TEMPERATURE: 97 F | OXYGEN SATURATION: 91 % | SYSTOLIC BLOOD PRESSURE: 115 MMHG | DIASTOLIC BLOOD PRESSURE: 73 MMHG | RESPIRATION RATE: 18 BRPM

## 2023-01-25 DIAGNOSIS — I48.20 ATRIAL FIBRILLATION, CHRONIC (HCC): ICD-10-CM

## 2023-01-25 DIAGNOSIS — Z74.09 IMPAIRED MOBILITY AND ADLS: ICD-10-CM

## 2023-01-25 DIAGNOSIS — J90 PLEURAL EFFUSION: ICD-10-CM

## 2023-01-25 DIAGNOSIS — U07.1 COVID: Primary | ICD-10-CM

## 2023-01-25 DIAGNOSIS — J15.9 BACTERIAL PNEUMONIA: ICD-10-CM

## 2023-01-25 DIAGNOSIS — E11.65 TYPE 2 DIABETES MELLITUS WITH HYPERGLYCEMIA, WITHOUT LONG-TERM CURRENT USE OF INSULIN (HCC): ICD-10-CM

## 2023-01-25 DIAGNOSIS — Z78.9 IMPAIRED MOBILITY AND ADLS: ICD-10-CM

## 2023-01-25 PROCEDURE — 99307 SBSQ NF CARE SF MDM 10: CPT

## 2023-03-13 ENCOUNTER — TELEPHONE (OUTPATIENT)
Dept: FAMILY MEDICINE CLINIC | Facility: CLINIC | Age: 88
End: 2023-03-13

## 2023-03-13 PROBLEM — I35.0 AORTIC STENOSIS, SEVERE: Status: ACTIVE | Noted: 2022-12-29

## 2023-03-13 PROBLEM — I35.0 AORTIC STENOSIS, SEVERE: Status: RESOLVED | Noted: 2022-12-29 | Resolved: 2023-03-13

## 2023-03-13 RX ORDER — FAMOTIDINE 20 MG/1
TABLET, FILM COATED ORAL
COMMUNITY
Start: 2023-02-27

## 2023-03-13 RX ORDER — BUMETANIDE 1 MG/1
TABLET ORAL
COMMUNITY
Start: 2023-02-27

## 2023-03-14 NOTE — ASSESSMENT & PLAN NOTE
Stable, continue present management   Lab Results   Component Value Date/Time    DESEAN 1.83 (A) 07/05/2022 12:00 AM    GFR 42 10/26/2021 12:00 AM    GFRNAA 34 10/13/2022 12:00 AM

## 2023-03-14 NOTE — ASSESSMENT & PLAN NOTE
As for his Diabetes, it is well controlled, no significant medication side effects noted. Recommendations are: continue present meds, lose weight by increased dietary compliance and exercise and will check labs as ordered.     Lab Results   Component Value Date    A1C 7.5 10/13/2022    A1C 8.5 (A) 07/15/2022      Blood Sugar Medications          metFORMIN 500 MG Oral Tab    pioglitazone 30 MG Oral Tab

## 2023-03-15 ENCOUNTER — TELEMEDICINE (OUTPATIENT)
Dept: FAMILY MEDICINE CLINIC | Facility: CLINIC | Age: 88
End: 2023-03-15
Payer: MEDICARE

## 2023-03-15 ENCOUNTER — TELEPHONE (OUTPATIENT)
Dept: FAMILY MEDICINE CLINIC | Facility: CLINIC | Age: 88
End: 2023-03-15

## 2023-03-15 DIAGNOSIS — I50.22 CHRONIC HFREF (HEART FAILURE WITH REDUCED EJECTION FRACTION) (HCC): ICD-10-CM

## 2023-03-15 DIAGNOSIS — I48.4 ATYPICAL ATRIAL FLUTTER (HCC): ICD-10-CM

## 2023-03-15 DIAGNOSIS — I35.8 AORTIC VALVE SCLEROSIS: ICD-10-CM

## 2023-03-15 DIAGNOSIS — H25.813 COMBINED FORMS OF AGE-RELATED CATARACT, BILATERAL: ICD-10-CM

## 2023-03-15 DIAGNOSIS — E11.65 TYPE 2 DIABETES MELLITUS WITH HYPERGLYCEMIA, WITHOUT LONG-TERM CURRENT USE OF INSULIN (HCC): ICD-10-CM

## 2023-03-15 DIAGNOSIS — F02.A4 MILD LATE ONSET ALZHEIMER'S DEMENTIA WITH ANXIETY (HCC): ICD-10-CM

## 2023-03-15 DIAGNOSIS — D68.69 SECONDARY HYPERCOAGULABLE STATE (HCC): ICD-10-CM

## 2023-03-15 DIAGNOSIS — E11.40 TYPE 2 DIABETES MELLITUS WITH DIABETIC NEUROPATHY, WITHOUT LONG-TERM CURRENT USE OF INSULIN (HCC): Chronic | ICD-10-CM

## 2023-03-15 DIAGNOSIS — I87.2 VENOUS INSUFFICIENCY OF BOTH LOWER EXTREMITIES: ICD-10-CM

## 2023-03-15 DIAGNOSIS — N18.32 STAGE 3B CHRONIC KIDNEY DISEASE (HCC): ICD-10-CM

## 2023-03-15 DIAGNOSIS — R41.3 MEMORY CHANGE: Primary | ICD-10-CM

## 2023-03-15 DIAGNOSIS — G30.1 MILD LATE ONSET ALZHEIMER'S DEMENTIA WITH ANXIETY (HCC): ICD-10-CM

## 2023-03-15 DIAGNOSIS — I70.0 ATHEROSCLEROSIS OF AORTA (HCC): ICD-10-CM

## 2023-03-15 DIAGNOSIS — E78.2 MIXED HYPERLIPIDEMIA: ICD-10-CM

## 2023-03-15 NOTE — TELEPHONE ENCOUNTER
Joshua Nicolas from [facility] is calling regarding Gio Joshi has question about his medication. He was seen here 3/14/23. intact

## 2023-04-04 ENCOUNTER — TELEPHONE (OUTPATIENT)
Dept: FAMILY MEDICINE CLINIC | Facility: CLINIC | Age: 88
End: 2023-04-04

## 2023-04-04 NOTE — TELEPHONE ENCOUNTER
Called Lissette and Left message for her to call back to see how they deal with starting a patient on pallative care

## 2023-04-04 NOTE — TELEPHONE ENCOUNTER
Marisol Lipoma from [facility] pts daughter would like to get another order for pallative care     Marisol Lipoma [phone]

## 2023-04-04 NOTE — TELEPHONE ENCOUNTER
Jordyn Michael from [address] [place] [address] calling to requesting another order for palliative care eval. Order was given 3/15, but pt did not qualify at that time. Jordyn Michael states pt has further declined and is losing weight. Routed to Dr. Mateus Nunes - ok for another order?  Last order in Letters

## 2023-04-05 ENCOUNTER — TELEPHONE (OUTPATIENT)
Dept: FAMILY MEDICINE CLINIC | Facility: CLINIC | Age: 88
End: 2023-04-05

## 2023-04-05 DIAGNOSIS — I50.22 CHRONIC HFREF (HEART FAILURE WITH REDUCED EJECTION FRACTION) (HCC): Primary | ICD-10-CM

## 2023-04-05 DIAGNOSIS — G30.1 SEVERE LATE ONSET ALZHEIMER'S DEMENTIA WITHOUT BEHAVIORAL DISTURBANCE, PSYCHOTIC DISTURBANCE, MOOD DISTURBANCE, OR ANXIETY (HCC): ICD-10-CM

## 2023-04-05 DIAGNOSIS — F02.C0 SEVERE LATE ONSET ALZHEIMER'S DEMENTIA WITHOUT BEHAVIORAL DISTURBANCE, PSYCHOTIC DISTURBANCE, MOOD DISTURBANCE, OR ANXIETY (HCC): ICD-10-CM

## 2023-04-05 NOTE — TELEPHONE ENCOUNTER
He has been steadily declining with dementia and is now appropriate for hospice.   Talk to [facility] hospice Dr. Robin Alexander and I get permission for hospice

## 2023-04-05 NOTE — TELEPHONE ENCOUNTER
Called and talked  To Ina Garcia and the hospice was there and the doctor Dr Raymundo Roberson said he was not qualified for palliative care, Dr Ralph Elkins sent order for palliative care. Now they are looking to see if Dr Ralph Elkins would be OK if nelida was placed in Hospice due to decline in health. Bhargav Petres

## 2023-04-05 NOTE — TELEPHONE ENCOUNTER
Tutu Mccarty from [address] [place] [address] call                [phone] regarding a follow up consult   Request a nurse call back a asap.

## 2023-04-06 NOTE — TELEPHONE ENCOUNTER
Celeste from [address] [place] [address]          [phone] call for mutual pt regarding a ok with Dr. Radha Lizama place pt  in hospice to follow up from yesterday conversation. Fax [phone] Phone number.

## 2023-04-06 NOTE — TELEPHONE ENCOUNTER
Antonette Gotti calling back to speak with nurse to verify if it ok to place patient on hospice. She states that she has questions. Attempted to reach triage.    Please advise    CB# [phone]

## 2023-04-06 NOTE — TELEPHONE ENCOUNTER
1. Chronic HFrEF (heart failure with reduced ejection fraction) ([facility] [place] [address] (Primary)  Overview:  Diagnosis 6/2022 with CV management,   Orders:  -     OP REFERRAL TO HOSPICE  2. 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  Thanks, Kathryn Luong MD

## 2023-04-07 NOTE — TELEPHONE ENCOUNTER
Delmis Colon and Mady Lopez are calling from [facility] returning your call has questions

## 2023-04-10 ENCOUNTER — TELEPHONE (OUTPATIENT)
Dept: FAMILY MEDICINE CLINIC | Facility: CLINIC | Age: 88
End: 2023-04-10

## 2023-04-10 RX ORDER — METOPROLOL SUCCINATE 50 MG/1
TABLET, EXTENDED RELEASE ORAL
COMMUNITY
Start: 2023-03-31

## 2023-04-10 NOTE — TELEPHONE ENCOUNTER
Kamila with [facility] is requesting to speak with Dr. Daniele Morales. She states that this patient was just admitted to their facility. Please advise.      CB# [phone]

## 2023-04-10 NOTE — TELEPHONE ENCOUNTER
Dr. Oralia Elizabeth - pt now in hospice. Would you like to defer care to hospice doc? No need to call back if so. If not, are there any cardiac meds that should be discontinued?

## (undated) DIAGNOSIS — R39.9 UTI SYMPTOMS: Primary | ICD-10-CM

## (undated) DIAGNOSIS — E11.65 TYPE 2 DIABETES MELLITUS WITH HYPERGLYCEMIA, WITHOUT LONG-TERM CURRENT USE OF INSULIN (HCC): ICD-10-CM

## (undated) DIAGNOSIS — E78.2 MIXED HYPERLIPIDEMIA: Primary | ICD-10-CM

## (undated) DEVICE — KENDALL SCD EXPRESS SLEEVES, KNEE LENGTH, MEDIUM: Brand: KENDALL SCD

## (undated) DEVICE — SYRINGE 30ML LL TIP

## (undated) DEVICE — ELLIK BLADDER EVACUTR DISP

## (undated) DEVICE — SOL GLY 1.5 3000ML

## (undated) DEVICE — URINE DRAINAGE BAG,BAG, NEEDLE SAMPLING, DRAIN TUBE: Brand: DOVER

## (undated) DEVICE — Device: Brand: OLYMPUS

## (undated) DEVICE — BASIC DOUBLE BASIN 1-LF: Brand: MEDLINE INDUSTRIES, INC.

## (undated) DEVICE — GAMMEX® PI HYBRID SIZE 7.5, STERILE POWDER-FREE SURGICAL GLOVE, POLYISOPRENE AND NEOPRENE BLEND: Brand: GAMMEX

## (undated) DEVICE — CYSTO CDS-LF: Brand: MEDLINE INDUSTRIES, INC.

## (undated) DEVICE — SOL H2O 1000ML BTL

## (undated) DEVICE — 1071 S-DRP URO STLE-GAMA 10/BX,4X/C: Brand: STERI-DRAPE™

## (undated) DEVICE — REM POLYHESIVE ADULT PATIENT RETURN ELECTRODE: Brand: VALLEYLAB

## (undated) DEVICE — GAMMEX® PI HYBRID SIZE 8, STERILE POWDER-FREE SURGICAL GLOVE, POLYISOPRENE AND NEOPRENE BLEND: Brand: GAMMEX

## (undated) DEVICE — Device

## (undated) DEVICE — PLASTC TOOMEY SYRNG DISP

## (undated) DEVICE — SOL  .9 3000ML

## (undated) NOTE — MR AVS SNAPSHOT
[facility] [place]  [place] [place] [address] [phone]               Thank you for choosing us for your health care visit with Michelle Massey MD.  We are glad to serve you and happy to provide you with this summa CALCIUM + D 600-200 MG-UNIT Tabs   Generic drug:  Calcium Carbonate-Vitamin D   daily. Dicyclomine HCl 10 MG Caps   Take 10 mg by mouth 2 (two) times daily as needed.    Commonly known as:  BENTYL           glipiZIDE 5 MG Tabs   Take 5 mg by mout

## (undated) NOTE — LETTER
[address] [place] [address] DEPARTMENT  [address] Asim Rowan, [address] [facility]  [address]      Dear Jamison Mcardle:     You had previously enrolled in our [address]

## (undated) NOTE — LETTER
Elissa Connell [address] [address]  [place], [address] [place] [address]    Consent for Operation  Date: __________________                                Time: _______________    1.  I authorize the performance upon Anand Delaney the following operation:  Procedu 6. I consent to the photographing or videotaping of the operations or procedures to be performed, including appropriate portions of my body for medical, scientific, or educational purposes, provided my identity is not revealed by the pictures or by descrip 10. If I have a Do Not Resuscitate order in place, the surgeon and I (or the individual authorized to consent on my behalf) will discuss and agree as to whether the Do Not Resuscitate order will remain in effect or will be discontinued during the performan a. Allow the anesthesiologist (anesthesia doctor) to give me medicine and do additional procedures as necessary.  Some examples are: Starting or using an “IV” to give me medicine, fluids or blood during my procedure, and having a breathing tube placed to he 7. Regional Anesthesia (“spinal”, “epidural”, & “nerve blocks”): I understand that rare but potential complications include headache, bleeding, infection, seizure, irregular heart rhythms, and nerve injury.     I can change my mind about having anesthesia

## (undated) NOTE — LETTER
[address]  [id number]         October 17, 2019        [address] [place] [id number]      Dear Constantino Huynh:      I recently contacted you to notify Evel

## (undated) NOTE — ED AVS SNAPSHOT
Peggy Thomas   MRN: [id number]    Department:  [facility] Emergency Department   Date of Visit:  11/24/2019           Disclosure     Insurance plans vary and the physician(s) referred by the ER may not be covered by your plan.  Please contact tell this physician (or your personal doctor if your instructions are to return to your personal doctor) about any new or lasting problems. The primary care or specialist physician will see patients referred from the [facility] Emergency Department.  Tip Contreras

## (undated) NOTE — LETTER
[facility] Department  Phone: [phone]  Right Fax: [phone]  EVALUATION REQUEST PREOP    Sent By:  Frances Leon Rn Date: 6/24/19    Patient Name: Solange Peck  Surgery Date: 6/26/2019    CSN: [id number]  Medical Record: [id number]

## (undated) NOTE — ED AVS SNAPSHOT
Maribell Maciel   MRN: [id number]    Department:  [facility] Emergency Department   Date of Visit:  12/21/2019           Disclosure     Insurance plans vary and the physician(s) referred by the ER may not be covered by your plan.  Please contact tell this physician (or your personal doctor if your instructions are to return to your personal doctor) about any new or lasting problems. The primary care or specialist physician will see patients referred from the [facility] Emergency Department.  Doug Kemp

## (undated) NOTE — LETTER
[facility], [facility], [address] [place] [address], [place] [address] [address] 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

## (undated) NOTE — LETTER
Understanding Sedation for Pain Procedures  Conscious sedation is a combination of medicines used to help you relax and to block pain during your procedure; you will probably stay awake and be relaxed.  You will receive the medication through an intravenous Printed: July 5, 2019     Medical Record #: [id number]                                    Page: 1 of  1

## (undated) NOTE — MR AVS SNAPSHOT
[facility] [address]  [place] [place] [address] [phone]               Thank you for choosing us for your health care visit with Michelle Massey MD.  We are glad to serve you and happy to provide you with this summa Commonly known as:  BENTYL           MetFORMIN HCl 850 MG Tabs   Take 1 tablet (850 mg total) by mouth daily with breakfast.   Commonly known as:  GLUCOPHAGE           MULTIVITAMIN OR   daily.            omega-3 fatty acids 1000 MG Caps   Take 4 Caps by renae